# Patient Record
Sex: MALE | Race: WHITE | NOT HISPANIC OR LATINO
[De-identification: names, ages, dates, MRNs, and addresses within clinical notes are randomized per-mention and may not be internally consistent; named-entity substitution may affect disease eponyms.]

---

## 2017-07-13 PROBLEM — I49.3 PVC'S (PREMATURE VENTRICULAR CONTRACTIONS): Status: ACTIVE | Noted: 2017-07-13

## 2017-07-13 PROBLEM — I48.0 PAROXYSMAL ATRIAL FIBRILLATION (HCC): Status: ACTIVE | Noted: 2017-07-13

## 2017-07-13 PROBLEM — I74.9 TIA DUE TO EMBOLISM (HCC): Status: ACTIVE | Noted: 2017-07-13

## 2017-07-13 PROBLEM — G45.9 TIA DUE TO EMBOLISM (HCC): Status: ACTIVE | Noted: 2017-07-13

## 2017-07-13 PROBLEM — J41.0 SIMPLE CHRONIC BRONCHITIS (HCC): Status: ACTIVE | Noted: 2017-07-13

## 2017-07-27 ENCOUNTER — LAB SERVICES (OUTPATIENT)
Dept: OTHER | Age: 62
End: 2017-07-27

## 2017-07-27 ENCOUNTER — CHARTING TRANS (OUTPATIENT)
Dept: OTHER | Age: 62
End: 2017-07-27

## 2017-07-27 LAB — RAPID STREP GROUP A: POSITIVE

## 2017-07-27 ASSESSMENT — PAIN SCALES - GENERAL: PAINLEVEL_OUTOF10: 8

## 2017-08-16 ENCOUNTER — CHARTING TRANS (OUTPATIENT)
Dept: OTHER | Age: 62
End: 2017-08-16

## 2017-09-21 ENCOUNTER — CHARTING TRANS (OUTPATIENT)
Dept: OTHER | Age: 62
End: 2017-09-21

## 2017-10-10 ENCOUNTER — IMAGING SERVICES (OUTPATIENT)
Dept: OTHER | Age: 62
End: 2017-10-10

## 2017-10-10 ENCOUNTER — LAB SERVICES (OUTPATIENT)
Dept: OTHER | Age: 62
End: 2017-10-10

## 2017-10-10 ENCOUNTER — HOSPITAL (OUTPATIENT)
Dept: OTHER | Age: 62
End: 2017-10-10
Attending: INTERNAL MEDICINE

## 2017-10-19 ENCOUNTER — CHARTING TRANS (OUTPATIENT)
Dept: OTHER | Age: 62
End: 2017-10-19

## 2017-10-19 LAB
ALBUMIN SERPL-MCNC: 3.8 G/DL (ref 3.6–5.1)
ALBUMIN/GLOB SERPL: 1.1 (ref 1–2.4)
ALP SERPL-CCNC: 70 UNITS/L (ref 45–117)
ALT SERPL-CCNC: 35 UNITS/L
ANION GAP SERPL CALC-SCNC: 12 MMOL/L (ref 10–20)
APPEARANCE UR: CLEAR
AST SERPL-CCNC: 25 UNITS/L
BACTERIA #/AREA URNS HPF: NORMAL /HPF
BASOPHILS # BLD: 0.1 K/MCL (ref 0–0.3)
BASOPHILS NFR BLD: 1 %
BILIRUB SERPL-MCNC: 0.6 MG/DL (ref 0.2–1)
BILIRUB UR QL: NEGATIVE
BUN SERPL-MCNC: 19 MG/DL (ref 6–20)
BUN/CREAT SERPL: 23 (ref 7–25)
CALCIUM SERPL-MCNC: 9.3 MG/DL (ref 8.4–10.2)
CHLORIDE SERPL-SCNC: 108 MMOL/L (ref 98–107)
CHOLEST SERPL-MCNC: 174 MG/DL
CHOLEST/HDLC SERPL: 5.4
CO2 SERPL-SCNC: 23 MMOL/L (ref 21–32)
COLOR UR: YELLOW
CREAT SERPL-MCNC: 0.82 MG/DL (ref 0.67–1.17)
DIFFERENTIAL METHOD BLD: ABNORMAL
EOSINOPHIL # BLD: 0.3 K/MCL (ref 0.1–0.5)
EOSINOPHIL NFR BLD: 4 %
ERYTHROCYTE [DISTWIDTH] IN BLOOD: 14.7 % (ref 11–15)
GLOBULIN SER-MCNC: 3.4 G/DL (ref 2–4)
GLUCOSE SERPL-MCNC: 86 MG/DL (ref 65–99)
GLUCOSE UR-MCNC: NEGATIVE MG/DL
HDLC SERPL-MCNC: 32 MG/DL
HEMATOCRIT: 42.4 % (ref 39–51)
HEMOGLOBIN: 14 G/DL (ref 13–17)
HYALINE CASTS #/AREA URNS LPF: NORMAL /LPF (ref 0–5)
KETONES UR-MCNC: NEGATIVE MG/DL
LDLC SERPL CALC-MCNC: 110 MG/DL
LENGTH OF FAST TIME PATIENT: 12 HRS
LENGTH OF FAST TIME PATIENT: 12 HRS
LYMPHOCYTES # BLD: 2 K/MCL (ref 1–4)
LYMPHOCYTES NFR BLD: 27 %
MEAN CORPUSCULAR HEMOGLOBIN: 28.4 PG (ref 26–34)
MEAN CORPUSCULAR HGB CONC: 33 G/DL (ref 32–36.5)
MEAN CORPUSCULAR VOLUME: 86 FL (ref 78–100)
MONOCYTES # BLD: 0.5 K/MCL (ref 0.3–0.9)
MONOCYTES NFR BLD: 7 %
MUCOUS THREADS URNS QL MICRO: PRESENT
NEUTROPHILS # BLD: 4.7 K/MCL (ref 1.8–7.7)
NEUTROPHILS NFR BLD: 61 %
NITRITE UR QL: NEGATIVE
NONHDLC SERPL-MCNC: 142 MG/DL
PH UR: 6 UNITS (ref 5–7)
PLATELET COUNT: 187 K/MCL (ref 140–450)
POTASSIUM SERPL-SCNC: 4.2 MMOL/L (ref 3.4–5.1)
PROT UR QL: NEGATIVE MG/DL
RBC #/AREA URNS HPF: NORMAL /HPF (ref 0–3)
RBC-URINE: NEGATIVE
RED CELL COUNT: 4.93 MIL/MCL (ref 4.5–5.9)
SODIUM SERPL-SCNC: 139 MMOL/L (ref 135–145)
SP GR UR: 1.01 (ref 1–1.03)
SPECIMEN SOURCE: NORMAL
SPERM URNS QL MICRO: PRESENT
SQUAMOUS #/AREA URNS HPF: NORMAL /HPF (ref 0–5)
TOTAL PROTEIN: 7.2 G/DL (ref 6.4–8.2)
TRIGL SERPL-MCNC: 159 MG/DL
TSH SERPL-ACNC: 2.32 MCUNITS/ML (ref 0.35–5)
UROBILINOGEN UR QL: 0.2 MG/DL (ref 0–1)
WBC #/AREA URNS HPF: NORMAL /HPF (ref 0–5)
WBC-URINE: NEGATIVE
WHITE BLOOD COUNT: 7.5 K/MCL (ref 4.2–11)

## 2017-12-19 ENCOUNTER — CHARTING TRANS (OUTPATIENT)
Dept: OTHER | Age: 62
End: 2017-12-19

## 2018-01-15 PROBLEM — E66.9 OBESITY (BMI 35.0-39.9 WITHOUT COMORBIDITY): Status: ACTIVE | Noted: 2018-01-15

## 2018-01-15 PROBLEM — G89.29 CHRONIC BILATERAL LOW BACK PAIN WITHOUT SCIATICA: Status: ACTIVE | Noted: 2018-01-15

## 2018-01-15 PROBLEM — M54.50 CHRONIC BILATERAL LOW BACK PAIN WITHOUT SCIATICA: Status: ACTIVE | Noted: 2018-01-15

## 2018-01-15 PROBLEM — M62.830 BACK MUSCLE SPASM: Status: ACTIVE | Noted: 2018-01-15

## 2018-01-15 PROBLEM — I10 ESSENTIAL HYPERTENSION: Status: ACTIVE | Noted: 2018-01-15

## 2018-01-15 PROBLEM — J44.9 CHRONIC OBSTRUCTIVE PULMONARY DISEASE, UNSPECIFIED COPD TYPE (HCC): Status: ACTIVE | Noted: 2018-01-15

## 2018-05-02 PROBLEM — M79.642 BILATERAL HAND PAIN: Status: ACTIVE | Noted: 2018-05-02

## 2018-05-02 PROBLEM — M19.042 PRIMARY OSTEOARTHRITIS OF BOTH HANDS: Status: ACTIVE | Noted: 2018-05-02

## 2018-05-02 PROBLEM — E66.9 OBESITY (BMI 35.0-39.9 WITHOUT COMORBIDITY): Status: RESOLVED | Noted: 2018-01-15 | Resolved: 2018-05-02

## 2018-05-02 PROBLEM — E66.9 CLASS 1 OBESITY WITHOUT SERIOUS COMORBIDITY WITH BODY MASS INDEX (BMI) OF 32.0 TO 32.9 IN ADULT, UNSPECIFIED OBESITY TYPE: Status: ACTIVE | Noted: 2018-05-02

## 2018-05-02 PROBLEM — M79.641 BILATERAL HAND PAIN: Status: ACTIVE | Noted: 2018-05-02

## 2018-05-02 PROBLEM — M19.041 PRIMARY OSTEOARTHRITIS OF BOTH HANDS: Status: ACTIVE | Noted: 2018-05-02

## 2018-08-06 PROCEDURE — 80307 DRUG TEST PRSMV CHEM ANLYZR: CPT | Performed by: INTERNAL MEDICINE

## 2018-08-15 PROBLEM — M18.12 OSTEOARTHRITIS OF LEFT THUMB: Status: ACTIVE | Noted: 2018-08-15

## 2018-08-15 PROBLEM — E66.9 OBESITY (BMI 30.0-34.9): Status: ACTIVE | Noted: 2018-08-15

## 2018-08-15 PROBLEM — Z13.220 SCREENING CHOLESTEROL LEVEL: Status: ACTIVE | Noted: 2018-08-15

## 2018-08-15 PROBLEM — K21.9 GASTROESOPHAGEAL REFLUX DISEASE WITHOUT ESOPHAGITIS: Status: ACTIVE | Noted: 2018-08-15

## 2018-08-15 PROBLEM — M18.11 OSTEOARTHRITIS OF RIGHT THUMB: Status: ACTIVE | Noted: 2018-08-15

## 2018-08-15 PROBLEM — E66.9 CLASS 1 OBESITY WITHOUT SERIOUS COMORBIDITY WITH BODY MASS INDEX (BMI) OF 32.0 TO 32.9 IN ADULT, UNSPECIFIED OBESITY TYPE: Status: RESOLVED | Noted: 2018-05-02 | Resolved: 2018-08-15

## 2018-08-15 PROCEDURE — 81003 URINALYSIS AUTO W/O SCOPE: CPT | Performed by: INTERNAL MEDICINE

## 2018-11-02 VITALS
SYSTOLIC BLOOD PRESSURE: 122 MMHG | BODY MASS INDEX: 35 KG/M2 | HEIGHT: 72 IN | DIASTOLIC BLOOD PRESSURE: 73 MMHG | HEART RATE: 73 BPM | RESPIRATION RATE: 16 BRPM | TEMPERATURE: 98.2 F | WEIGHT: 258.38 LBS

## 2018-11-02 VITALS
SYSTOLIC BLOOD PRESSURE: 151 MMHG | TEMPERATURE: 97.9 F | RESPIRATION RATE: 18 BRPM | WEIGHT: 262.57 LBS | DIASTOLIC BLOOD PRESSURE: 88 MMHG | BODY MASS INDEX: 35.56 KG/M2 | HEIGHT: 72 IN | OXYGEN SATURATION: 95 % | HEART RATE: 75 BPM

## 2018-11-03 VITALS
BODY MASS INDEX: 34.99 KG/M2 | DIASTOLIC BLOOD PRESSURE: 74 MMHG | WEIGHT: 258.31 LBS | RESPIRATION RATE: 17 BRPM | SYSTOLIC BLOOD PRESSURE: 134 MMHG | TEMPERATURE: 98.1 F | HEIGHT: 72 IN | HEART RATE: 82 BPM

## 2018-11-03 VITALS
RESPIRATION RATE: 20 BRPM | TEMPERATURE: 98.2 F | HEART RATE: 74 BPM | WEIGHT: 255 LBS | OXYGEN SATURATION: 96 % | HEIGHT: 72 IN | BODY MASS INDEX: 34.54 KG/M2

## 2018-11-03 VITALS
BODY MASS INDEX: 34.73 KG/M2 | RESPIRATION RATE: 18 BRPM | HEART RATE: 79 BPM | WEIGHT: 256.44 LBS | SYSTOLIC BLOOD PRESSURE: 129 MMHG | HEIGHT: 72 IN | TEMPERATURE: 97.9 F | DIASTOLIC BLOOD PRESSURE: 69 MMHG

## 2018-11-17 PROBLEM — J41.0 SIMPLE CHRONIC BRONCHITIS (HCC): Status: RESOLVED | Noted: 2017-07-13 | Resolved: 2018-11-17

## 2018-11-24 PROBLEM — F34.1 DYSTHYMIA: Status: ACTIVE | Noted: 2018-11-24

## 2018-11-24 PROBLEM — Z23 NEED FOR VACCINATION FOR PNEUMOCOCCUS: Status: ACTIVE | Noted: 2018-11-24

## 2018-11-24 PROBLEM — Z23 NEED FOR PROPHYLACTIC VACCINATION AGAINST STREPTOCOCCUS PNEUMONIAE (PNEUMOCOCCUS): Status: ACTIVE | Noted: 2018-11-24

## 2018-11-24 PROBLEM — Z23 NEED FOR PROPHYLACTIC VACCINATION AND INOCULATION AGAINST INFLUENZA: Status: ACTIVE | Noted: 2018-11-24

## 2018-11-24 PROBLEM — R45.86 EMOTIONAL LABILITY: Status: ACTIVE | Noted: 2018-11-24

## 2018-11-24 PROBLEM — G25.81 RESTLESS LEG SYNDROME: Status: ACTIVE | Noted: 2018-11-24

## 2019-03-16 PROBLEM — Z12.5 SCREENING PSA (PROSTATE SPECIFIC ANTIGEN): Status: ACTIVE | Noted: 2019-03-16

## 2019-03-16 PROBLEM — R12 HEARTBURN: Status: ACTIVE | Noted: 2019-03-16

## 2019-07-10 PROBLEM — I48.92 ATRIAL FLUTTER BY ELECTROCARDIOGRAM (HCC): Status: ACTIVE | Noted: 2019-07-10

## 2019-07-10 PROBLEM — H91.93 DECREASED HEARING OF BOTH EARS: Status: ACTIVE | Noted: 2019-07-10

## 2019-07-18 PROBLEM — M75.81 ROTATOR CUFF TENDINITIS, RIGHT: Status: ACTIVE | Noted: 2019-07-18

## 2019-07-18 PROBLEM — M75.81 ROTATOR CUFF TENDONITIS, RIGHT: Status: ACTIVE | Noted: 2019-07-18

## 2019-07-22 PROBLEM — M25.511 CHRONIC PAIN OF BOTH SHOULDERS: Status: ACTIVE | Noted: 2019-07-22

## 2019-07-22 PROBLEM — M25.512 CHRONIC PAIN OF BOTH SHOULDERS: Status: ACTIVE | Noted: 2019-07-22

## 2019-07-22 PROBLEM — G89.29 CHRONIC PAIN OF BOTH SHOULDERS: Status: ACTIVE | Noted: 2019-07-22

## 2019-07-26 PROBLEM — I48.92 ATRIAL FLUTTER, UNSPECIFIED TYPE (HCC): Status: ACTIVE | Noted: 2019-07-26

## 2019-09-03 PROBLEM — Z23 NEED FOR PROPHYLACTIC VACCINATION AND INOCULATION AGAINST INFLUENZA: Status: RESOLVED | Noted: 2018-11-24 | Resolved: 2019-09-03

## 2019-09-03 PROBLEM — Z13.220 SCREENING CHOLESTEROL LEVEL: Status: RESOLVED | Noted: 2018-08-15 | Resolved: 2019-09-03

## 2019-09-03 PROBLEM — Z23 NEED FOR VACCINATION FOR PNEUMOCOCCUS: Status: RESOLVED | Noted: 2018-11-24 | Resolved: 2019-09-03

## 2019-09-03 PROBLEM — Z12.5 SCREENING PSA (PROSTATE SPECIFIC ANTIGEN): Status: RESOLVED | Noted: 2019-03-16 | Resolved: 2019-09-03

## 2019-09-21 PROBLEM — Z00.00 MEDICARE ANNUAL WELLNESS VISIT, SUBSEQUENT: Status: ACTIVE | Noted: 2019-09-21

## 2019-09-21 PROBLEM — G45.9 TIA DUE TO EMBOLISM (HCC): Status: RESOLVED | Noted: 2017-07-13 | Resolved: 2019-09-21

## 2019-09-21 PROBLEM — I74.9 TIA DUE TO EMBOLISM (HCC): Status: RESOLVED | Noted: 2017-07-13 | Resolved: 2019-09-21

## 2019-09-21 PROBLEM — I48.92 ATRIAL FLUTTER BY ELECTROCARDIOGRAM (HCC): Status: RESOLVED | Noted: 2019-07-10 | Resolved: 2019-09-21

## 2019-09-21 PROBLEM — Z00.00 ANNUAL PHYSICAL EXAM: Status: ACTIVE | Noted: 2019-09-21

## 2019-09-24 PROBLEM — I48.92 ATRIAL FLUTTER, UNSPECIFIED TYPE (HCC): Status: RESOLVED | Noted: 2019-07-26 | Resolved: 2019-09-24

## 2019-09-24 PROBLEM — Z23 NEED FOR PROPHYLACTIC VACCINATION AGAINST STREPTOCOCCUS PNEUMONIAE (PNEUMOCOCCUS): Status: RESOLVED | Noted: 2018-11-24 | Resolved: 2019-09-24

## 2019-09-24 PROBLEM — I49.3 PVC'S (PREMATURE VENTRICULAR CONTRACTIONS): Status: RESOLVED | Noted: 2017-07-13 | Resolved: 2019-09-24

## 2019-10-30 PROBLEM — I25.10 MILD CAD: Status: ACTIVE | Noted: 2019-10-30

## 2019-11-25 PROBLEM — Z00.00 ANNUAL PHYSICAL EXAM: Status: RESOLVED | Noted: 2019-09-21 | Resolved: 2019-11-25

## 2019-11-25 PROBLEM — G89.29 CHRONIC BILATERAL LOW BACK PAIN WITHOUT SCIATICA: Status: RESOLVED | Noted: 2018-01-15 | Resolved: 2019-11-25

## 2019-11-25 PROBLEM — M19.041 PRIMARY OSTEOARTHRITIS OF BOTH HANDS: Status: RESOLVED | Noted: 2018-05-02 | Resolved: 2019-11-25

## 2019-11-25 PROBLEM — I48.0 PAROXYSMAL ATRIAL FIBRILLATION (HCC): Status: RESOLVED | Noted: 2017-07-13 | Resolved: 2019-11-25

## 2019-11-25 PROBLEM — G45.9 TRANSIENT ISCHEMIC ATTACK: Status: ACTIVE | Noted: 2019-11-25

## 2019-11-25 PROBLEM — M25.512 CHRONIC PAIN OF BOTH SHOULDERS: Status: RESOLVED | Noted: 2019-07-22 | Resolved: 2019-11-25

## 2019-11-25 PROBLEM — I10 ESSENTIAL HYPERTENSION: Status: RESOLVED | Noted: 2018-01-15 | Resolved: 2019-11-25

## 2019-11-25 PROBLEM — R06.02 SHORTNESS OF BREATH: Status: ACTIVE | Noted: 2019-11-25

## 2019-11-25 PROBLEM — G89.29 CHRONIC PAIN OF BOTH SHOULDERS: Status: RESOLVED | Noted: 2019-07-22 | Resolved: 2019-11-25

## 2019-11-25 PROBLEM — Z00.00 MEDICARE ANNUAL WELLNESS VISIT, SUBSEQUENT: Status: RESOLVED | Noted: 2019-09-21 | Resolved: 2019-11-25

## 2019-11-25 PROBLEM — E66.9 OBESITY (BMI 30.0-34.9): Status: RESOLVED | Noted: 2018-08-15 | Resolved: 2019-11-25

## 2019-11-25 PROBLEM — J44.9 CHRONIC OBSTRUCTIVE PULMONARY DISEASE, UNSPECIFIED COPD TYPE (HCC): Status: RESOLVED | Noted: 2018-01-15 | Resolved: 2019-11-25

## 2019-11-25 PROBLEM — M19.042 PRIMARY OSTEOARTHRITIS OF BOTH HANDS: Status: RESOLVED | Noted: 2018-05-02 | Resolved: 2019-11-25

## 2019-11-25 PROBLEM — M75.81 ROTATOR CUFF TENDONITIS, RIGHT: Status: RESOLVED | Noted: 2019-07-18 | Resolved: 2019-11-25

## 2019-11-25 PROBLEM — R45.86 EMOTIONAL LABILITY: Status: ACTIVE | Noted: 2019-11-25

## 2019-11-25 PROBLEM — M19.90 OSTEOARTHRITIS: Status: ACTIVE | Noted: 2019-11-25

## 2019-11-25 PROBLEM — F34.1 DYSTHYMIA: Status: RESOLVED | Noted: 2018-11-24 | Resolved: 2019-11-25

## 2019-11-25 PROBLEM — Z23 NEED FOR VACCINATION WITH 13-POLYVALENT PNEUMOCOCCAL CONJUGATE VACCINE: Status: ACTIVE | Noted: 2019-11-25

## 2019-11-25 PROBLEM — G25.81 RESTLESS LEG SYNDROME: Status: RESOLVED | Noted: 2018-11-24 | Resolved: 2019-11-25

## 2019-11-25 PROBLEM — M54.50 CHRONIC BILATERAL LOW BACK PAIN WITHOUT SCIATICA: Status: RESOLVED | Noted: 2018-01-15 | Resolved: 2019-11-25

## 2019-11-25 PROBLEM — I49.3 VENTRICULAR PREMATURE BEATS: Status: ACTIVE | Noted: 2019-11-25

## 2019-11-25 PROBLEM — M18.12 OSTEOARTHRITIS OF LEFT THUMB: Status: RESOLVED | Noted: 2018-08-15 | Resolved: 2019-11-25

## 2019-11-25 PROBLEM — M25.511 CHRONIC PAIN OF BOTH SHOULDERS: Status: RESOLVED | Noted: 2019-07-22 | Resolved: 2019-11-25

## 2019-11-25 PROBLEM — R12 HEARTBURN: Status: RESOLVED | Noted: 2019-03-16 | Resolved: 2019-11-25

## 2019-11-25 PROBLEM — R45.86 EMOTIONAL LABILITY: Status: RESOLVED | Noted: 2018-11-24 | Resolved: 2019-11-25

## 2019-11-25 PROBLEM — M18.11 OSTEOARTHRITIS OF RIGHT THUMB: Status: RESOLVED | Noted: 2018-08-15 | Resolved: 2019-11-25

## 2019-11-25 PROBLEM — H91.93 DECREASED HEARING OF BOTH EARS: Status: RESOLVED | Noted: 2019-07-10 | Resolved: 2019-11-25

## 2019-11-25 PROBLEM — M62.830 BACK MUSCLE SPASM: Status: RESOLVED | Noted: 2018-01-15 | Resolved: 2019-11-25

## 2019-11-25 PROBLEM — M79.642 BILATERAL HAND PAIN: Status: RESOLVED | Noted: 2018-05-02 | Resolved: 2019-11-25

## 2019-11-25 PROBLEM — I25.10 MILD CAD: Status: RESOLVED | Noted: 2019-10-30 | Resolved: 2019-11-25

## 2019-11-25 PROBLEM — M79.641 BILATERAL HAND PAIN: Status: RESOLVED | Noted: 2018-05-02 | Resolved: 2019-11-25

## 2019-11-25 PROBLEM — I48.91 ATRIAL FIBRILLATION (HCC): Status: ACTIVE | Noted: 2019-11-25

## 2019-11-25 PROBLEM — M75.81 ROTATOR CUFF TENDINITIS, RIGHT: Status: RESOLVED | Noted: 2019-07-18 | Resolved: 2019-11-25

## 2019-11-25 PROBLEM — K21.9 GASTROESOPHAGEAL REFLUX DISEASE WITHOUT ESOPHAGITIS: Status: RESOLVED | Noted: 2018-08-15 | Resolved: 2019-11-25

## 2020-01-10 PROBLEM — Z98.890 STATUS POST ABLATION OF ATRIAL FIBRILLATION: Status: ACTIVE | Noted: 2020-01-10

## 2020-01-10 PROBLEM — Z86.79 STATUS POST ABLATION OF ATRIAL FIBRILLATION: Status: ACTIVE | Noted: 2020-01-10

## 2020-02-24 PROBLEM — M62.830 BACK MUSCLE SPASM: Status: ACTIVE | Noted: 2020-02-24

## 2020-02-24 PROBLEM — M75.81 ROTATOR CUFF TENDINITIS, RIGHT: Status: ACTIVE | Noted: 2020-02-24

## 2020-02-24 PROBLEM — M75.82 ROTATOR CUFF TENDINITIS, LEFT: Status: ACTIVE | Noted: 2020-02-24

## 2020-02-24 PROBLEM — F41.9 ANXIETY: Status: ACTIVE | Noted: 2020-02-24

## 2020-02-24 PROBLEM — G89.29 CHRONIC BILATERAL LOW BACK PAIN WITHOUT SCIATICA: Status: ACTIVE | Noted: 2020-02-24

## 2020-02-24 PROBLEM — M54.50 CHRONIC BILATERAL LOW BACK PAIN WITHOUT SCIATICA: Status: ACTIVE | Noted: 2020-02-24

## 2020-05-27 PROBLEM — R39.11 URINARY HESITANCY: Status: ACTIVE | Noted: 2020-05-27

## 2020-05-27 PROBLEM — M54.40 CHRONIC BILATERAL LOW BACK PAIN WITH SCIATICA, SCIATICA LATERALITY UNSPECIFIED: Status: ACTIVE | Noted: 2020-05-27

## 2020-05-27 PROBLEM — G89.29 CHRONIC BILATERAL LOW BACK PAIN WITH SCIATICA, SCIATICA LATERALITY UNSPECIFIED: Status: ACTIVE | Noted: 2020-05-27

## 2020-05-27 PROBLEM — R06.02 SHORTNESS OF BREATH: Status: RESOLVED | Noted: 2019-11-25 | Resolved: 2020-05-27

## 2020-05-27 PROBLEM — J44.9 COPD (CHRONIC OBSTRUCTIVE PULMONARY DISEASE) (HCC): Status: ACTIVE | Noted: 2018-01-15

## 2020-05-27 PROBLEM — N40.1 BENIGN PROSTATIC HYPERPLASIA WITH URINARY HESITANCY: Status: ACTIVE | Noted: 2020-05-27

## 2020-05-27 PROBLEM — R39.11 BENIGN PROSTATIC HYPERPLASIA WITH URINARY HESITANCY: Status: ACTIVE | Noted: 2020-05-27

## 2020-05-27 PROBLEM — M54.14 THORACIC RADICULOPATHY: Status: ACTIVE | Noted: 2020-05-27

## 2020-12-19 PROBLEM — M25.511 CHRONIC PAIN OF BOTH SHOULDERS: Status: ACTIVE | Noted: 2020-12-19

## 2020-12-19 PROBLEM — G89.29 CHRONIC PAIN OF BOTH SHOULDERS: Status: ACTIVE | Noted: 2020-12-19

## 2020-12-19 PROBLEM — M75.80 ROTATOR CUFF TENDINITIS, UNSPECIFIED LATERALITY: Status: ACTIVE | Noted: 2020-12-19

## 2020-12-19 PROBLEM — M25.512 CHRONIC PAIN OF BOTH SHOULDERS: Status: ACTIVE | Noted: 2020-12-19

## 2021-01-18 PROBLEM — G45.9 TIA DUE TO EMBOLISM (HCC): Status: ACTIVE | Noted: 2021-01-18

## 2021-01-18 PROBLEM — I74.9 TIA DUE TO EMBOLISM (HCC): Status: ACTIVE | Noted: 2021-01-18

## 2021-01-26 PROBLEM — H53.8 BLURRY VISION, BILATERAL: Status: ACTIVE | Noted: 2021-01-26

## 2021-01-26 PROBLEM — E78.1 HYPERTRIGLYCERIDEMIA: Status: ACTIVE | Noted: 2021-01-26

## 2021-01-26 PROBLEM — F32.1 CURRENT MODERATE EPISODE OF MAJOR DEPRESSIVE DISORDER WITHOUT PRIOR EPISODE (HCC): Status: ACTIVE | Noted: 2021-01-26

## 2021-01-26 PROBLEM — R05.9 COUGH: Status: ACTIVE | Noted: 2021-01-26

## 2021-01-26 PROBLEM — Z11.9 ENCOUNTER FOR SCREENING EXAMINATION FOR INFECTIOUS DISEASE: Status: ACTIVE | Noted: 2021-01-26

## 2021-01-26 PROBLEM — Z20.822 EXPOSURE TO COVID-19 VIRUS: Status: ACTIVE | Noted: 2021-01-26

## 2021-03-12 PROBLEM — T84.032S: Status: ACTIVE | Noted: 2021-03-12

## 2021-03-12 PROBLEM — G89.29 CHRONIC PAIN OF RIGHT KNEE: Status: ACTIVE | Noted: 2021-03-12

## 2021-03-12 PROBLEM — M25.561 CHRONIC PAIN OF RIGHT KNEE: Status: ACTIVE | Noted: 2021-03-12

## 2021-09-01 RX ORDER — ACETAMINOPHEN 500 MG
1000 TABLET ORAL ONCE
Status: CANCELLED | OUTPATIENT
Start: 2021-09-01 | End: 2021-09-01

## 2021-09-03 PROBLEM — R39.12 BENIGN PROSTATIC HYPERPLASIA WITH WEAK URINARY STREAM: Status: ACTIVE | Noted: 2021-09-03

## 2021-09-03 PROBLEM — N40.1 BENIGN PROSTATIC HYPERPLASIA WITH WEAK URINARY STREAM: Status: ACTIVE | Noted: 2021-09-03

## 2021-09-21 PROBLEM — Z01.818 PREOPERATIVE EXAMINATION: Status: ACTIVE | Noted: 2021-09-21

## 2021-09-21 PROBLEM — Z13.6 SCREENING FOR CARDIOVASCULAR CONDITION: Status: ACTIVE | Noted: 2021-09-21

## 2021-09-28 PROBLEM — R79.89 LOW VITAMIN D LEVEL: Status: ACTIVE | Noted: 2021-09-28

## 2021-09-28 PROBLEM — Z13.228 SCREENING FOR METABOLIC DISORDER: Status: ACTIVE | Noted: 2021-09-28

## 2021-10-15 PROBLEM — Z23 NEED FOR INFLUENZA VACCINATION: Status: ACTIVE | Noted: 2021-10-15

## 2021-10-22 NOTE — H&P (VIEW-ONLY)
Patient ID: Christopher Cedeno     Chief Complaint:    Patient presents with:  Pre-Op: pre op right total knee revision 10/28/21        HPI:    Christopher Cedeno is a 76year old who presents today for evaluation of their right TKA.    They were seen previ on file  Transportation Needs:       Lack of Transportation (Medical): Not on file      Lack of Transportation (Non-Medical):  Not on file  Physical Activity:       Days of Exercise per Week: Not on file      Minutes of Exercise per Session: Not on file  [facility] Hypertension Father        ROS:      All other pertinent positives and negatives as noted above in HPI. Physical Exam:     Vital Signs: There were no vitals taken for this visit.   Estimated body mass index is 31.55 kg/m² as calculated from the follo Arthroplasty in place.  Tibial Position - neutral, Femoral position - neutral. Polyethylene demonstrates symmetric wear. Bethena Fairview is no evidence of fracture or dislocation.  Patellar height - appopriate and alignment is appropriate.  Osteolysis noted most pr arthroplasty including but not limited to infection rates, neurologic and vascular injuries, continued pain, etc.  They fully understand and wish to proceed with surgery.       The spectrum of treatment options were discussed with the patient in detail incl given high risk patient.  (has taken ampicillin in past with no issues)     - risks, benefits and alternatives discussed  - labs reviewed and meds given   - proceed with right total knee arthroplasty as planned            There are no diagnoses linked to th

## 2021-10-25 ENCOUNTER — LABORATORY ENCOUNTER (OUTPATIENT)
Dept: LAB | Facility: HOSPITAL | Age: 66
End: 2021-10-25
Attending: ORTHOPAEDIC SURGERY
Payer: MEDICARE

## 2021-10-25 DIAGNOSIS — T84.032D MECHANICAL LOOSENING OF INTERNAL RIGHT KNEE PROSTHETIC JOINT, SUBSEQUENT ENCOUNTER: ICD-10-CM

## 2021-10-25 PROCEDURE — 86901 BLOOD TYPING SEROLOGIC RH(D): CPT

## 2021-10-25 PROCEDURE — 86900 BLOOD TYPING SEROLOGIC ABO: CPT

## 2021-10-25 PROCEDURE — 86850 RBC ANTIBODY SCREEN: CPT

## 2021-10-28 ENCOUNTER — ANESTHESIA (OUTPATIENT)
Dept: SURGERY | Facility: HOSPITAL | Age: 66
End: 2021-10-28
Payer: MEDICARE

## 2021-10-28 ENCOUNTER — HOSPITAL ENCOUNTER (OUTPATIENT)
Facility: HOSPITAL | Age: 66
Discharge: HOME | End: 2021-10-29
Attending: ORTHOPAEDIC SURGERY | Admitting: ORTHOPAEDIC SURGERY
Payer: MEDICARE

## 2021-10-28 ENCOUNTER — APPOINTMENT (OUTPATIENT)
Dept: GENERAL RADIOLOGY | Facility: HOSPITAL | Age: 66
End: 2021-10-28
Attending: ORTHOPAEDIC SURGERY
Payer: MEDICARE

## 2021-10-28 ENCOUNTER — ANESTHESIA EVENT (OUTPATIENT)
Dept: SURGERY | Facility: HOSPITAL | Age: 66
End: 2021-10-28
Payer: MEDICARE

## 2021-10-28 DIAGNOSIS — T84.032D MECHANICAL LOOSENING OF INTERNAL RIGHT KNEE PROSTHETIC JOINT, SUBSEQUENT ENCOUNTER: Primary | ICD-10-CM

## 2021-10-28 PROCEDURE — 87206 SMEAR FLUORESCENT/ACID STAI: CPT | Performed by: ORTHOPAEDIC SURGERY

## 2021-10-28 PROCEDURE — 87176 TISSUE HOMOGENIZATION CULTR: CPT | Performed by: ORTHOPAEDIC SURGERY

## 2021-10-28 PROCEDURE — 0SRC0J9 REPLACEMENT OF RIGHT KNEE JOINT WITH SYNTHETIC SUBSTITUTE, CEMENTED, OPEN APPROACH: ICD-10-PCS | Performed by: ORTHOPAEDIC SURGERY

## 2021-10-28 PROCEDURE — 88331 PATH CONSLTJ SURG 1 BLK 1SPC: CPT | Performed by: ORTHOPAEDIC SURGERY

## 2021-10-28 PROCEDURE — 87102 FUNGUS ISOLATION CULTURE: CPT | Performed by: ORTHOPAEDIC SURGERY

## 2021-10-28 PROCEDURE — 73560 X-RAY EXAM OF KNEE 1 OR 2: CPT | Performed by: ORTHOPAEDIC SURGERY

## 2021-10-28 PROCEDURE — 87070 CULTURE OTHR SPECIMN AEROBIC: CPT | Performed by: ORTHOPAEDIC SURGERY

## 2021-10-28 PROCEDURE — 0SPC0JZ REMOVAL OF SYNTHETIC SUBSTITUTE FROM RIGHT KNEE JOINT, OPEN APPROACH: ICD-10-PCS | Performed by: ORTHOPAEDIC SURGERY

## 2021-10-28 PROCEDURE — 76942 ECHO GUIDE FOR BIOPSY: CPT | Performed by: ANESTHESIOLOGY

## 2021-10-28 PROCEDURE — 88311 DECALCIFY TISSUE: CPT | Performed by: ORTHOPAEDIC SURGERY

## 2021-10-28 PROCEDURE — 87116 MYCOBACTERIA CULTURE: CPT | Performed by: ORTHOPAEDIC SURGERY

## 2021-10-28 PROCEDURE — 87075 CULTR BACTERIA EXCEPT BLOOD: CPT | Performed by: ORTHOPAEDIC SURGERY

## 2021-10-28 PROCEDURE — 88300 SURGICAL PATH GROSS: CPT | Performed by: ORTHOPAEDIC SURGERY

## 2021-10-28 PROCEDURE — 87205 SMEAR GRAM STAIN: CPT | Performed by: ORTHOPAEDIC SURGERY

## 2021-10-28 PROCEDURE — 88305 TISSUE EXAM BY PATHOLOGIST: CPT | Performed by: ORTHOPAEDIC SURGERY

## 2021-10-28 DEVICE — REFOBACIN BC R 1X40 US: Type: IMPLANTABLE DEVICE | Site: KNEE | Status: FUNCTIONAL

## 2021-10-28 DEVICE — IMPLANTABLE DEVICE: Type: IMPLANTABLE DEVICE | Site: KNEE | Status: FUNCTIONAL

## 2021-10-28 RX ORDER — BUPROPION HYDROCHLORIDE 100 MG/1
100 TABLET ORAL DAILY
Status: DISCONTINUED | OUTPATIENT
Start: 2021-10-29 | End: 2021-10-29

## 2021-10-28 RX ORDER — DEXAMETHASONE SODIUM PHOSPHATE 10 MG/ML
INJECTION, SOLUTION INTRAMUSCULAR; INTRAVENOUS AS NEEDED
Status: DISCONTINUED | OUTPATIENT
Start: 2021-10-28 | End: 2021-10-28 | Stop reason: SURG

## 2021-10-28 RX ORDER — DEXAMETHASONE SODIUM PHOSPHATE 4 MG/ML
VIAL (ML) INJECTION AS NEEDED
Status: DISCONTINUED | OUTPATIENT
Start: 2021-10-28 | End: 2021-10-28 | Stop reason: SURG

## 2021-10-28 RX ORDER — TAMSULOSIN HYDROCHLORIDE 0.4 MG/1
0.4 CAPSULE ORAL 2 TIMES DAILY
Status: DISCONTINUED | OUTPATIENT
Start: 2021-10-28 | End: 2021-10-29

## 2021-10-28 RX ORDER — POLYETHYLENE GLYCOL 3350 17 G/17G
17 POWDER, FOR SOLUTION ORAL DAILY PRN
Status: DISCONTINUED | OUTPATIENT
Start: 2021-10-28 | End: 2021-10-29

## 2021-10-28 RX ORDER — SODIUM CHLORIDE, SODIUM LACTATE, POTASSIUM CHLORIDE, CALCIUM CHLORIDE 600; 310; 30; 20 MG/100ML; MG/100ML; MG/100ML; MG/100ML
INJECTION, SOLUTION INTRAVENOUS CONTINUOUS
Status: DISCONTINUED | OUTPATIENT
Start: 2021-10-28 | End: 2021-10-29

## 2021-10-28 RX ORDER — DIPHENHYDRAMINE HYDROCHLORIDE 50 MG/ML
25 INJECTION INTRAMUSCULAR; INTRAVENOUS ONCE AS NEEDED
Status: ACTIVE | OUTPATIENT
Start: 2021-10-28 | End: 2021-10-28

## 2021-10-28 RX ORDER — BISACODYL 10 MG
10 SUPPOSITORY, RECTAL RECTAL
Status: DISCONTINUED | OUTPATIENT
Start: 2021-10-28 | End: 2021-10-29

## 2021-10-28 RX ORDER — BUPIVACAINE HYDROCHLORIDE 7.5 MG/ML
INJECTION, SOLUTION INTRASPINAL AS NEEDED
Status: DISCONTINUED | OUTPATIENT
Start: 2021-10-28 | End: 2021-10-28 | Stop reason: SURG

## 2021-10-28 RX ORDER — ROPINIROLE 1 MG/1
1 TABLET, FILM COATED ORAL 3 TIMES DAILY
COMMUNITY
End: 2022-01-07

## 2021-10-28 RX ORDER — GABAPENTIN 300 MG/1
300 CAPSULE ORAL 3 TIMES DAILY
Status: DISCONTINUED | OUTPATIENT
Start: 2021-10-28 | End: 2021-10-29

## 2021-10-28 RX ORDER — CLONIDINE 100 UG/ML
INJECTION, SOLUTION EPIDURAL AS NEEDED
Status: DISCONTINUED | OUTPATIENT
Start: 2021-10-28 | End: 2021-10-28 | Stop reason: SURG

## 2021-10-28 RX ORDER — METOCLOPRAMIDE HYDROCHLORIDE 5 MG/ML
10 INJECTION INTRAMUSCULAR; INTRAVENOUS AS NEEDED
Status: DISCONTINUED | OUTPATIENT
Start: 2021-10-28 | End: 2021-10-28 | Stop reason: HOSPADM

## 2021-10-28 RX ORDER — CEFAZOLIN SODIUM 1 G/3ML
INJECTION, POWDER, FOR SOLUTION INTRAMUSCULAR; INTRAVENOUS AS NEEDED
Status: DISCONTINUED | OUTPATIENT
Start: 2021-10-28 | End: 2021-10-28 | Stop reason: SURG

## 2021-10-28 RX ORDER — OXYCODONE HYDROCHLORIDE 5 MG/1
2.5 TABLET ORAL EVERY 4 HOURS PRN
Status: DISCONTINUED | OUTPATIENT
Start: 2021-10-28 | End: 2021-10-29

## 2021-10-28 RX ORDER — DIGOXIN 125 MCG
250 TABLET ORAL DAILY
Status: DISCONTINUED | OUTPATIENT
Start: 2021-10-29 | End: 2021-10-29

## 2021-10-28 RX ORDER — ACETAMINOPHEN 325 MG/1
650 TABLET ORAL ONCE
Status: COMPLETED | OUTPATIENT
Start: 2021-10-28 | End: 2021-10-28

## 2021-10-28 RX ORDER — SODIUM CHLORIDE, SODIUM LACTATE, POTASSIUM CHLORIDE, CALCIUM CHLORIDE 600; 310; 30; 20 MG/100ML; MG/100ML; MG/100ML; MG/100ML
INJECTION, SOLUTION INTRAVENOUS CONTINUOUS
Status: DISCONTINUED | OUTPATIENT
Start: 2021-10-28 | End: 2021-10-28 | Stop reason: HOSPADM

## 2021-10-28 RX ORDER — ACETAMINOPHEN 325 MG/1
TABLET ORAL
Status: COMPLETED
Start: 2021-10-28 | End: 2021-10-28

## 2021-10-28 RX ORDER — DEXAMETHASONE SODIUM PHOSPHATE 10 MG/ML
8 INJECTION, SOLUTION INTRAMUSCULAR; INTRAVENOUS ONCE
Status: COMPLETED | OUTPATIENT
Start: 2021-10-29 | End: 2021-10-29

## 2021-10-28 RX ORDER — ONDANSETRON 2 MG/ML
4 INJECTION INTRAMUSCULAR; INTRAVENOUS AS NEEDED
Status: DISCONTINUED | OUTPATIENT
Start: 2021-10-28 | End: 2021-10-28 | Stop reason: HOSPADM

## 2021-10-28 RX ORDER — ROPIVACAINE HYDROCHLORIDE 5 MG/ML
INJECTION, SOLUTION EPIDURAL; INFILTRATION; PERINEURAL AS NEEDED
Status: DISCONTINUED | OUTPATIENT
Start: 2021-10-28 | End: 2021-10-28 | Stop reason: SURG

## 2021-10-28 RX ORDER — KETAMINE HYDROCHLORIDE 50 MG/ML
INJECTION, SOLUTION, CONCENTRATE INTRAMUSCULAR; INTRAVENOUS AS NEEDED
Status: DISCONTINUED | OUTPATIENT
Start: 2021-10-28 | End: 2021-10-28 | Stop reason: SURG

## 2021-10-28 RX ORDER — BUPRENORPHINE HYDROCHLORIDE 0.32 MG/ML
INJECTION INTRAMUSCULAR; INTRAVENOUS AS NEEDED
Status: DISCONTINUED | OUTPATIENT
Start: 2021-10-28 | End: 2021-10-28 | Stop reason: SURG

## 2021-10-28 RX ORDER — MIDAZOLAM HYDROCHLORIDE 1 MG/ML
INJECTION INTRAMUSCULAR; INTRAVENOUS AS NEEDED
Status: DISCONTINUED | OUTPATIENT
Start: 2021-10-28 | End: 2021-10-28 | Stop reason: SURG

## 2021-10-28 RX ORDER — SENNOSIDES 8.6 MG
17.2 TABLET ORAL NIGHTLY
Status: DISCONTINUED | OUTPATIENT
Start: 2021-10-28 | End: 2021-10-29

## 2021-10-28 RX ORDER — HYDROMORPHONE HYDROCHLORIDE 1 MG/ML
0.2 INJECTION, SOLUTION INTRAMUSCULAR; INTRAVENOUS; SUBCUTANEOUS EVERY 2 HOUR PRN
Status: DISCONTINUED | OUTPATIENT
Start: 2021-10-28 | End: 2021-10-29

## 2021-10-28 RX ORDER — FLECAINIDE ACETATE 100 MG/1
150 TABLET ORAL 2 TIMES DAILY
Status: DISCONTINUED | OUTPATIENT
Start: 2021-10-28 | End: 2021-10-29

## 2021-10-28 RX ORDER — TRANEXAMIC ACID 10 MG/ML
1000 INJECTION, SOLUTION INTRAVENOUS ONCE
Status: COMPLETED | OUTPATIENT
Start: 2021-10-28 | End: 2021-10-28

## 2021-10-28 RX ORDER — OMEPRAZOLE 40 MG/1
40 CAPSULE, DELAYED RELEASE ORAL DAILY
COMMUNITY
End: 2021-11-15

## 2021-10-28 RX ORDER — HYDROMORPHONE HYDROCHLORIDE 1 MG/ML
0.4 INJECTION, SOLUTION INTRAMUSCULAR; INTRAVENOUS; SUBCUTANEOUS EVERY 2 HOUR PRN
Status: DISCONTINUED | OUTPATIENT
Start: 2021-10-28 | End: 2021-10-29

## 2021-10-28 RX ORDER — SODIUM PHOSPHATE, DIBASIC AND SODIUM PHOSPHATE, MONOBASIC 7; 19 G/133ML; G/133ML
1 ENEMA RECTAL ONCE AS NEEDED
Status: DISCONTINUED | OUTPATIENT
Start: 2021-10-28 | End: 2021-10-29

## 2021-10-28 RX ORDER — MELATONIN
325
Status: DISCONTINUED | OUTPATIENT
Start: 2021-10-29 | End: 2021-10-29

## 2021-10-28 RX ORDER — DOCUSATE SODIUM 100 MG/1
100 CAPSULE, LIQUID FILLED ORAL 2 TIMES DAILY
Status: DISCONTINUED | OUTPATIENT
Start: 2021-10-28 | End: 2021-10-29

## 2021-10-28 RX ORDER — ALPRAZOLAM 0.5 MG/1
0.5 TABLET ORAL 2 TIMES DAILY PRN
Status: DISCONTINUED | OUTPATIENT
Start: 2021-10-28 | End: 2021-10-29

## 2021-10-28 RX ORDER — OXYCODONE HYDROCHLORIDE 5 MG/1
5 TABLET ORAL EVERY 4 HOURS PRN
Status: DISCONTINUED | OUTPATIENT
Start: 2021-10-28 | End: 2021-10-29

## 2021-10-28 RX ORDER — ACETAMINOPHEN 325 MG/1
650 TABLET ORAL 4 TIMES DAILY
Status: DISCONTINUED | OUTPATIENT
Start: 2021-10-28 | End: 2021-10-29

## 2021-10-28 RX ORDER — SODIUM CHLORIDE 9 MG/ML
INJECTION, SOLUTION INTRAVENOUS CONTINUOUS
Status: DISCONTINUED | OUTPATIENT
Start: 2021-10-28 | End: 2021-10-29

## 2021-10-28 RX ORDER — KETOROLAC TROMETHAMINE 15 MG/ML
15 INJECTION, SOLUTION INTRAMUSCULAR; INTRAVENOUS EVERY 6 HOURS
Status: DISPENSED | OUTPATIENT
Start: 2021-10-28 | End: 2021-10-29

## 2021-10-28 RX ORDER — HYDROMORPHONE HYDROCHLORIDE 1 MG/ML
0.4 INJECTION, SOLUTION INTRAMUSCULAR; INTRAVENOUS; SUBCUTANEOUS EVERY 5 MIN PRN
Status: DISCONTINUED | OUTPATIENT
Start: 2021-10-28 | End: 2021-10-28 | Stop reason: HOSPADM

## 2021-10-28 RX ORDER — TRANEXAMIC ACID 10 MG/ML
1000 INJECTION, SOLUTION INTRAVENOUS ONCE
Status: DISCONTINUED | OUTPATIENT
Start: 2021-10-28 | End: 2021-10-28 | Stop reason: HOSPADM

## 2021-10-28 RX ORDER — ROPINIROLE 1 MG/1
1 TABLET, FILM COATED ORAL 3 TIMES DAILY
Status: DISCONTINUED | OUTPATIENT
Start: 2021-10-28 | End: 2021-10-29

## 2021-10-28 RX ORDER — DIPHENHYDRAMINE HYDROCHLORIDE 50 MG/ML
12.5 INJECTION INTRAMUSCULAR; INTRAVENOUS EVERY 4 HOURS PRN
Status: DISCONTINUED | OUTPATIENT
Start: 2021-10-28 | End: 2021-10-29

## 2021-10-28 RX ORDER — ONDANSETRON 2 MG/ML
INJECTION INTRAMUSCULAR; INTRAVENOUS AS NEEDED
Status: DISCONTINUED | OUTPATIENT
Start: 2021-10-28 | End: 2021-10-28 | Stop reason: SURG

## 2021-10-28 RX ORDER — SERTRALINE HYDROCHLORIDE 100 MG/1
200 TABLET, FILM COATED ORAL DAILY
Status: DISCONTINUED | OUTPATIENT
Start: 2021-10-29 | End: 2021-10-29

## 2021-10-28 RX ORDER — VANCOMYCIN HYDROCHLORIDE
15 ONCE
Status: DISCONTINUED | OUTPATIENT
Start: 2021-10-28 | End: 2021-10-28 | Stop reason: HOSPADM

## 2021-10-28 RX ORDER — PROCHLORPERAZINE EDISYLATE 5 MG/ML
10 INJECTION INTRAMUSCULAR; INTRAVENOUS EVERY 6 HOURS PRN
Status: DISCONTINUED | OUTPATIENT
Start: 2021-10-28 | End: 2021-10-29

## 2021-10-28 RX ORDER — ERGOCALCIFEROL (VITAMIN D2) 1250 MCG
50000 CAPSULE ORAL WEEKLY
COMMUNITY

## 2021-10-28 RX ORDER — DIPHENHYDRAMINE HCL 25 MG
25 CAPSULE ORAL EVERY 4 HOURS PRN
Status: DISCONTINUED | OUTPATIENT
Start: 2021-10-28 | End: 2021-10-29

## 2021-10-28 RX ORDER — NALOXONE HYDROCHLORIDE 0.4 MG/ML
80 INJECTION, SOLUTION INTRAMUSCULAR; INTRAVENOUS; SUBCUTANEOUS AS NEEDED
Status: DISCONTINUED | OUTPATIENT
Start: 2021-10-28 | End: 2021-10-28 | Stop reason: HOSPADM

## 2021-10-28 RX ORDER — TRAMADOL HYDROCHLORIDE 50 MG/1
50 TABLET ORAL EVERY 6 HOURS
Status: DISCONTINUED | OUTPATIENT
Start: 2021-10-28 | End: 2021-10-29

## 2021-10-28 RX ORDER — CEFAZOLIN SODIUM/WATER 2 G/20 ML
2 SYRINGE (ML) INTRAVENOUS EVERY 8 HOURS
Status: COMPLETED | OUTPATIENT
Start: 2021-10-28 | End: 2021-10-29

## 2021-10-28 RX ORDER — PANTOPRAZOLE SODIUM 40 MG/1
40 TABLET, DELAYED RELEASE ORAL
Status: DISCONTINUED | OUTPATIENT
Start: 2021-10-29 | End: 2021-10-29

## 2021-10-28 RX ORDER — HYDROMORPHONE HYDROCHLORIDE 1 MG/ML
INJECTION, SOLUTION INTRAMUSCULAR; INTRAVENOUS; SUBCUTANEOUS
Status: COMPLETED
Start: 2021-10-28 | End: 2021-10-28

## 2021-10-28 RX ORDER — ONDANSETRON 2 MG/ML
4 INJECTION INTRAMUSCULAR; INTRAVENOUS EVERY 4 HOURS PRN
Status: DISCONTINUED | OUTPATIENT
Start: 2021-10-28 | End: 2021-10-29

## 2021-10-28 RX ADMIN — SODIUM CHLORIDE, SODIUM LACTATE, POTASSIUM CHLORIDE, CALCIUM CHLORIDE: 600; 310; 30; 20 INJECTION, SOLUTION INTRAVENOUS at 14:12:00

## 2021-10-28 RX ADMIN — KETAMINE HYDROCHLORIDE 10 MG: 50 INJECTION, SOLUTION, CONCENTRATE INTRAMUSCULAR; INTRAVENOUS at 15:00:00

## 2021-10-28 RX ADMIN — DEXAMETHASONE SODIUM PHOSPHATE 2 MG: 10 INJECTION, SOLUTION INTRAMUSCULAR; INTRAVENOUS at 14:23:00

## 2021-10-28 RX ADMIN — ONDANSETRON 4 MG: 2 INJECTION INTRAMUSCULAR; INTRAVENOUS at 14:43:00

## 2021-10-28 RX ADMIN — ROPIVACAINE HYDROCHLORIDE 20 ML: 5 INJECTION, SOLUTION EPIDURAL; INFILTRATION; PERINEURAL at 14:23:00

## 2021-10-28 RX ADMIN — CEFAZOLIN SODIUM 2 G: 1 INJECTION, POWDER, FOR SOLUTION INTRAMUSCULAR; INTRAVENOUS at 14:26:00

## 2021-10-28 RX ADMIN — MIDAZOLAM HYDROCHLORIDE 2 MG: 1 INJECTION INTRAMUSCULAR; INTRAVENOUS at 14:15:00

## 2021-10-28 RX ADMIN — CLONIDINE 50 MCG: 100 INJECTION, SOLUTION EPIDURAL at 14:23:00

## 2021-10-28 RX ADMIN — BUPIVACAINE HYDROCHLORIDE 2 ML: 7.5 INJECTION, SOLUTION INTRASPINAL at 14:19:00

## 2021-10-28 RX ADMIN — BUPRENORPHINE HYDROCHLORIDE 150 MCG: 0.32 INJECTION INTRAMUSCULAR; INTRAVENOUS at 14:23:00

## 2021-10-28 RX ADMIN — DEXAMETHASONE SODIUM PHOSPHATE 8 MG: 4 MG/ML VIAL (ML) INJECTION at 14:43:00

## 2021-10-28 RX ADMIN — TRANEXAMIC ACID 1000 MG: 10 INJECTION, SOLUTION INTRAVENOUS at 14:26:00

## 2021-10-28 NOTE — ANESTHESIA PREPROCEDURE EVALUATION
PRE-OP EVALUATION    Patient Name: Mervat Bañuelos    Admit Diagnosis: Mechanical loosening of internal right knee prosthetic joint, subsequent encounter [A43.848N]    Pre-op Diagnosis: Mechanical loosening of internal right knee prosthetic joint, subseq Meals, Disp: , Rfl:   losartan Potassium 100 MG Oral Tab, Take 1 tablet (100 mg total) by mouth daily. , Disp: 90 tablet, Rfl: 3  Sertraline HCl 100 MG Oral Tab, Take 2 tablets (200 mg total) by mouth daily. , Disp: 60 tablet, Rfl: 5  fluticasone-salmeterol mouth 2 (two) times daily. , Disp: 180 tablet, Rfl: 1        Allergies: Chlorine and Penicillins      Anesthesia Evaluation    Patient summary reviewed.     Anesthetic Complications  (-) history of anesthetic complications         GI/Hepatic/Renal      (+) G regular  Rate: normal  (-) murmur   Dental      Dental appliance(s): upper dentures and lower dentures       Pulmonary    Pulmonary exam normal.  Breath sounds clear to auscultation bilaterally.                Other findings            ASA: 3   Plan: spinal

## 2021-10-28 NOTE — OPERATIVE REPORT
OPERATIVE REPORT    Facility: [facility]  Patient name: Maeola Mcardle  : [id number]        Medical record: [id number]  Date of procedure: 2021  Pre-operative diagnosis: Failed Right total knee arthroplasty  Post-operative diagnosis: of the components requiring revision surgery. We also discussed the possibility of an occult infection and that multiple specimens would be taken in the operating room.  Should these demonstrate anything concerning then he may require prolonged antibiotics of the synovium. A saw and osteotomes was then inserted between the femoral component and the cement interface. The component was removed with minimal bone loss.  The cement was then removed from the femur and a tissue culture was sent from the femoral surf observed. A trial polyethylene was then inserted. The trial components were then placed and the knee was found to have proper alignment and was stable through a full range of flexion and extension.    Scar tissue was then removed from around the location alignment. The patella tracked central with only slight lateral tilt which was corrected once the large arthrotomy was closed temporarily with towel clips. The trial was removed and the final poly surface was implanted.    A dilute (0.35%) betadine lavage w

## 2021-10-28 NOTE — ANESTHESIA PROCEDURE NOTES
Regional Block  Performed by: Jeremias Kellogg MD  Authorized by: Jeremias Kellogg MD       General Information and Staff    Start Time:  10/28/2021 2:20 PM  End Time:  10/28/2021 2:23 PM  Anesthesiologist:  Jeremias Kellogg MD  Performed by:   Anesth

## 2021-10-28 NOTE — INTERVAL H&P NOTE
Pre-op Diagnosis: Mechanical loosening of internal right knee prosthetic joint, subsequent encounter [T84.251D]    The above referenced H&P was reviewed by Alvaro Troncoso MD on 10/28/2021, the patient was examined and no significant changes have occurred i

## 2021-10-28 NOTE — ANESTHESIA PROCEDURE NOTES
Spinal Block  Performed by: Afshan Bundy MD  Authorized by: Afshan Bundy MD       General Information and Staff    Start Time:  10/28/2021 2:13 PM  End Time:  10/28/2021 2:19 PM  Anesthesiologist:  Afshan Bundy MD  Performed by:   Anesthes

## 2021-10-28 NOTE — ANESTHESIA POSTPROCEDURE EVALUATION
[address] Patient Status:  Outpatient in a Bed   Age/Gender 78year old male MRN [id number]   [facility] SURGERY Attending Meryl Phillips MD   Hosp Day # 0 PCP Fredi José MD       Anesthesia Post-op Note    RE

## 2021-10-29 VITALS
DIASTOLIC BLOOD PRESSURE: 89 MMHG | TEMPERATURE: 98 F | HEART RATE: 74 BPM | OXYGEN SATURATION: 94 % | WEIGHT: 226 LBS | HEIGHT: 72 IN | BODY MASS INDEX: 30.61 KG/M2 | RESPIRATION RATE: 20 BRPM | SYSTOLIC BLOOD PRESSURE: 116 MMHG

## 2021-10-29 PROCEDURE — 97165 OT EVAL LOW COMPLEX 30 MIN: CPT

## 2021-10-29 PROCEDURE — 97535 SELF CARE MNGMENT TRAINING: CPT

## 2021-10-29 PROCEDURE — 85018 HEMOGLOBIN: CPT | Performed by: ORTHOPAEDIC SURGERY

## 2021-10-29 PROCEDURE — 97161 PT EVAL LOW COMPLEX 20 MIN: CPT

## 2021-10-29 PROCEDURE — 97116 GAIT TRAINING THERAPY: CPT

## 2021-10-29 PROCEDURE — 94640 AIRWAY INHALATION TREATMENT: CPT

## 2021-10-29 PROCEDURE — 85014 HEMATOCRIT: CPT | Performed by: ORTHOPAEDIC SURGERY

## 2021-10-29 RX ORDER — CARVEDILOL 6.25 MG/1
6.25 TABLET ORAL 2 TIMES DAILY WITH MEALS
Status: DISCONTINUED | OUTPATIENT
Start: 2021-10-29 | End: 2021-10-29

## 2021-10-29 RX ORDER — LOSARTAN POTASSIUM 100 MG/1
100 TABLET ORAL DAILY
Status: DISCONTINUED | OUTPATIENT
Start: 2021-10-29 | End: 2021-10-29

## 2021-10-29 NOTE — PLAN OF CARE
Pt A&Ox4, VSS on 2L NC- IS encouraged. SCDs to BLE. Aquacel to R knee C/D/I w/ spandagrip. Teds on LLE. Accordion drain- sanguineous drainage. Pain controlled w/ PO meds. Voiding w/o difficulty; last BM 10/28/21. Tolerating regular diet.  WBAT- therapy to s

## 2021-10-29 NOTE — PROGRESS NOTES
Progress Note    Patient: Baron Najjar  Medical record #: [id number]    Baron Najjar is a 78year old y/o male who is POD #1 Revision right TKA. The patient's main complaint today is surgical pain at the operative site.   No numbness or tingli mg, Oral, QID  oxyCODONE immediate release tab 2.5 mg, 2.5 mg, Oral, Q4H PRN   Or  oxyCODONE immediate release tab 5 mg, 5 mg, Oral, Q4H PRN  HYDROmorphone HCl (DILAUDID) 1 MG/ML injection 0.2 mg, 0.2 mg, Intravenous, Q2H PRN   Or  HYDROmorphone HCl (DILAU syndrome      Labs:   Lab Results   Component Value Date    HGB 11.9 10/29/2021    HCT 36.9 10/29/2021         Assessment: 78year old  male POD #1 Revision right TKA    Plan:  Hgb - 11.9, acute blood loss anemia, consistent with post op changes, asymptoma

## 2021-10-29 NOTE — CM/SW NOTE
DUNG pre op Carter Rhodes. SW confirmed and sent updates in [facility]. SW met with pt and provided Aidin quality data on Blair. Pt does not have any needs at this time, SW to be made available if needs arise.      Landen 106, LSW

## 2021-10-29 NOTE — PROGRESS NOTES
Discharge instructions reviewed with patient. All questions answered. Patient states he watched discharge video. IV removed. Discharge home with Blossom Houser.

## 2021-10-29 NOTE — CONSULTS
[facility] Hospitalist Initial Consult       Reason for Consult: Medical Management sp TKA      History of Present Illness: Patient is a 78year old male with PMH sig for Afib, COPD, HTN who presents sp the above procedure.  He tolerated the procedure well without Infusions:   • lactated ringers Stopped (10/29/21 0802)   • sodium chloride Stopped (10/29/21 0400)     PRN: sodium chloride 0.9%, polyethylene glycol (PEG 3350), magnesium hydroxide, bisacodyl, Fleet Enema, ondansetron, Prochlorperazine Edisylate, diphenh Labs   Lab 10/29/21  0551   HGB 11.9*       No results for input(s): NA, K, CL, CO2, BUN, CREATSERUM, CA, CAION, MG, PHOS, GLU in the last 168 hours. No results for input(s): ALT, AST, ALB, AMYLASE, LIPASE, LDH in the last 168 hours.     Invalid input(s)

## 2021-10-29 NOTE — PHYSICAL THERAPY NOTE
PHYSICAL THERAPY QUICK EVALUATION - INPATIENT    Room Number: 370/370-A  Evaluation Date: 10/29/2021  Presenting Problem: s/p revision of R TKA 10/28/2021  Physician Order: PT Eval and Treat    History related to current admission: Patient is a 78 year o with ADLs, ambulates with no AD, and drives. Pt's spouse will be home and able to assist as needed.      SUBJECTIVE  \"I have to watch my toes turning out when I'm walking\"     OBJECTIVE  Precautions: Bed/chair alarm;Drain(s)  Fall Risk: High fall risk Assistance: Supervision  Distance (ft): 150  Assistive Device: Rolling walker  Pattern: R Decreased stance time (step to pattern, BLE external rotation)  Stoop/Curb Assistance: Not tested  Comment : Pt ascended and descended 4 stairs with use of B rails, s discharged from Physical Therapy services. Please re-order if a new functional limitation presents during this admission. GOALS  Patient was able to achieve the following goals . ..     Patient was able to transfer supervision which he will have at home

## 2021-10-29 NOTE — PLAN OF CARE
NURSING ADMISSION NOTE      Patient admitted via bed from PACU at [address] [place] [address] to room. Safety precautions initiated. Bed in low position. Call light in reach. Alert and oriented x4. VSS. O2 2L per NC.  Right knee pain described as cramping, mus

## 2021-10-29 NOTE — OCCUPATIONAL THERAPY NOTE
OCCUPATIONAL THERAPY QUICK EVALUATION - INPATIENT    Room Number: 370/370-A  Evaluation Date: 10/29/2021     Type of Evaluation: Quick Eval  Presenting Problem: s/p R TKA revision on 10/28     Physician Order: IP Consult to Occupational Therapy  Reason for SITUATION  Type of Home: Apartment  Home Layout: One level (2nd floor no elevator )  Lives With: Spouse    Toilet and Equipment: Standard height toilet; Toilet riser  Shower/Tub and Equipment: Tub-shower combo  Other Equipment: Other (Comment) (RW)    Occup ASSESSMENT  Supine to Sit : Not tested  Sit to Stand: Supervision    Skilled Therapy Provided: Pt was found sitting up in a chair. Pt was educated on safety.  Pt performed lower body dressing with supervision assist and upper body dressing with supervision Recommendations: None    PLAN   Patient has been evaluated and presents with no skilled Occupational Therapy needs at this time. Patient discharged from Occupational Therapy services.   Please re-order if a new functional limitation presents during this ad

## 2021-11-16 PROBLEM — Z96.651 STATUS POST REVISION OF TOTAL KNEE REPLACEMENT, RIGHT: Status: ACTIVE | Noted: 2021-11-16

## 2021-12-06 PROBLEM — M25.511 CHRONIC RIGHT SHOULDER PAIN: Status: ACTIVE | Noted: 2021-12-06

## 2021-12-06 PROBLEM — G89.29 CHRONIC RIGHT SHOULDER PAIN: Status: ACTIVE | Noted: 2021-12-06

## 2021-12-06 PROBLEM — F32.1 CURRENT MODERATE EPISODE OF MAJOR DEPRESSIVE DISORDER WITHOUT PRIOR EPISODE (HCC): Status: RESOLVED | Noted: 2021-01-26 | Resolved: 2021-12-06

## 2021-12-06 PROBLEM — M75.81 TENDINITIS OF RIGHT ROTATOR CUFF: Status: ACTIVE | Noted: 2021-12-06

## 2021-12-06 PROBLEM — F33.0 DEPRESSION, MAJOR, RECURRENT, MILD (HCC): Status: ACTIVE | Noted: 2021-12-06

## 2021-12-06 PROBLEM — Z20.822 EXPOSURE TO COVID-19 VIRUS: Status: RESOLVED | Noted: 2021-01-26 | Resolved: 2021-12-06

## 2022-02-25 PROBLEM — I48.0 PAF (PAROXYSMAL ATRIAL FIBRILLATION) (HCC): Status: ACTIVE | Noted: 2022-02-25

## 2022-02-25 PROBLEM — I70.0 THORACIC AORTA ATHEROSCLEROSIS (HCC): Status: ACTIVE | Noted: 2022-02-25

## 2022-02-25 PROBLEM — M75.80 ROTATOR CUFF TENDINITIS, UNSPECIFIED LATERALITY: Status: RESOLVED | Noted: 2020-12-19 | Resolved: 2022-02-25

## 2022-02-25 PROBLEM — D68.69 SECONDARY HYPERCOAGULABLE STATE (HCC): Status: ACTIVE | Noted: 2022-02-25

## 2022-02-25 PROBLEM — I48.91 ATRIAL FIBRILLATION (HCC): Status: RESOLVED | Noted: 2019-11-25 | Resolved: 2022-02-25

## 2022-03-04 PROBLEM — R39.11 BENIGN PROSTATIC HYPERPLASIA WITH URINARY HESITANCY: Status: RESOLVED | Noted: 2020-05-27 | Resolved: 2022-03-04

## 2022-03-04 PROBLEM — Z13.1 SCREENING FOR DIABETES MELLITUS: Status: ACTIVE | Noted: 2022-03-04

## 2022-03-04 PROBLEM — R39.12 BENIGN PROSTATIC HYPERPLASIA WITH WEAK URINARY STREAM: Status: RESOLVED | Noted: 2021-09-03 | Resolved: 2022-03-04

## 2022-03-04 PROBLEM — N40.0 BENIGN PROSTATIC HYPERPLASIA WITHOUT LOWER URINARY TRACT SYMPTOMS: Status: ACTIVE | Noted: 2022-03-04

## 2022-03-04 PROBLEM — N40.1 BENIGN PROSTATIC HYPERPLASIA WITH URINARY HESITANCY: Status: RESOLVED | Noted: 2020-05-27 | Resolved: 2022-03-04

## 2022-03-04 PROBLEM — I48.0 PAROXYSMAL ATRIAL FIBRILLATION (HCC): Status: ACTIVE | Noted: 2022-03-04

## 2022-03-04 PROBLEM — N40.1 BENIGN PROSTATIC HYPERPLASIA WITH WEAK URINARY STREAM: Status: RESOLVED | Noted: 2021-09-03 | Resolved: 2022-03-04

## 2024-01-04 ENCOUNTER — HOSPITAL ENCOUNTER (OUTPATIENT)
Facility: HOSPITAL | Age: 69
Setting detail: HOSPITAL OUTPATIENT SURGERY
Discharge: HOME | End: 2024-01-04
Attending: INTERNAL MEDICINE | Admitting: INTERNAL MEDICINE
Payer: MEDICARE

## 2024-01-04 ENCOUNTER — ANESTHESIA EVENT (OUTPATIENT)
Dept: ENDOSCOPY | Facility: HOSPITAL | Age: 69
End: 2024-01-04
Payer: MEDICARE

## 2024-01-04 ENCOUNTER — ANESTHESIA (OUTPATIENT)
Dept: ENDOSCOPY | Facility: HOSPITAL | Age: 69
End: 2024-01-04
Payer: MEDICARE

## 2024-01-04 VITALS
BODY MASS INDEX: 29.12 KG/M2 | RESPIRATION RATE: 16 BRPM | HEART RATE: 54 BPM | SYSTOLIC BLOOD PRESSURE: 127 MMHG | TEMPERATURE: 97 F | WEIGHT: 215 LBS | HEIGHT: 72 IN | DIASTOLIC BLOOD PRESSURE: 83 MMHG | OXYGEN SATURATION: 97 %

## 2024-01-04 PROCEDURE — 0DBK8ZX EXCISION OF ASCENDING COLON, VIA NATURAL OR ARTIFICIAL OPENING ENDOSCOPIC, DIAGNOSTIC: ICD-10-PCS | Performed by: INTERNAL MEDICINE

## 2024-01-04 PROCEDURE — 0DB18ZX EXCISION OF UPPER ESOPHAGUS, VIA NATURAL OR ARTIFICIAL OPENING ENDOSCOPIC, DIAGNOSTIC: ICD-10-PCS | Performed by: INTERNAL MEDICINE

## 2024-01-04 PROCEDURE — 0D758ZZ DILATION OF ESOPHAGUS, VIA NATURAL OR ARTIFICIAL OPENING ENDOSCOPIC: ICD-10-PCS | Performed by: INTERNAL MEDICINE

## 2024-01-04 PROCEDURE — 0DB38ZX EXCISION OF LOWER ESOPHAGUS, VIA NATURAL OR ARTIFICIAL OPENING ENDOSCOPIC, DIAGNOSTIC: ICD-10-PCS | Performed by: INTERNAL MEDICINE

## 2024-01-04 PROCEDURE — 0DB28ZX EXCISION OF MIDDLE ESOPHAGUS, VIA NATURAL OR ARTIFICIAL OPENING ENDOSCOPIC, DIAGNOSTIC: ICD-10-PCS | Performed by: INTERNAL MEDICINE

## 2024-01-04 PROCEDURE — 88305 TISSUE EXAM BY PATHOLOGIST: CPT | Performed by: INTERNAL MEDICINE

## 2024-01-04 RX ORDER — SODIUM CHLORIDE, SODIUM LACTATE, POTASSIUM CHLORIDE, CALCIUM CHLORIDE 600; 310; 30; 20 MG/100ML; MG/100ML; MG/100ML; MG/100ML
INJECTION, SOLUTION INTRAVENOUS CONTINUOUS
Status: DISCONTINUED | OUTPATIENT
Start: 2024-01-04 | End: 2024-01-04

## 2024-01-04 RX ORDER — LIDOCAINE HYDROCHLORIDE 10 MG/ML
INJECTION, SOLUTION EPIDURAL; INFILTRATION; INTRACAUDAL; PERINEURAL AS NEEDED
Status: DISCONTINUED | OUTPATIENT
Start: 2024-01-04 | End: 2024-01-04 | Stop reason: SURG

## 2024-01-04 RX ORDER — HYDROMORPHONE HYDROCHLORIDE 1 MG/ML
0.2 INJECTION, SOLUTION INTRAMUSCULAR; INTRAVENOUS; SUBCUTANEOUS EVERY 5 MIN PRN
Status: DISCONTINUED | OUTPATIENT
Start: 2024-01-04 | End: 2024-01-04

## 2024-01-04 RX ORDER — NALOXONE HYDROCHLORIDE 0.4 MG/ML
0.08 INJECTION, SOLUTION INTRAMUSCULAR; INTRAVENOUS; SUBCUTANEOUS AS NEEDED
Status: DISCONTINUED | OUTPATIENT
Start: 2024-01-04 | End: 2024-01-04

## 2024-01-04 RX ORDER — HYDROMORPHONE HYDROCHLORIDE 1 MG/ML
0.4 INJECTION, SOLUTION INTRAMUSCULAR; INTRAVENOUS; SUBCUTANEOUS EVERY 5 MIN PRN
Status: DISCONTINUED | OUTPATIENT
Start: 2024-01-04 | End: 2024-01-04

## 2024-01-04 RX ORDER — HYDROMORPHONE HYDROCHLORIDE 1 MG/ML
0.6 INJECTION, SOLUTION INTRAMUSCULAR; INTRAVENOUS; SUBCUTANEOUS EVERY 5 MIN PRN
Status: DISCONTINUED | OUTPATIENT
Start: 2024-01-04 | End: 2024-01-04

## 2024-01-04 RX ADMIN — SODIUM CHLORIDE, SODIUM LACTATE, POTASSIUM CHLORIDE, CALCIUM CHLORIDE: 600; 310; 30; 20 INJECTION, SOLUTION INTRAVENOUS at 08:30:00

## 2024-01-04 RX ADMIN — LIDOCAINE HYDROCHLORIDE 25 MG: 10 INJECTION, SOLUTION EPIDURAL; INFILTRATION; INTRACAUDAL; PERINEURAL at 07:50:00

## 2024-01-04 NOTE — DISCHARGE INSTRUCTIONS
Home Care Instructions for Colonoscopy and/or Gastroscopy with Sedation    Diet:  - Resume your regular diet as tolerated unless otherwise instructed.  - Start with light meals to minimize bloating.  - Do not drink alcohol today.    Medication:  - If you have questions about resuming your normal medications, please contact your Primary Care Physician.    Activities:  - Take it easy today. Do not return to work today.  - Do not drive today.  - Do not operate any machinery today (including kitchen equipment).    Colonoscopy:  - You may notice some rectal \"spotting\" (a little blood on the toilet tissue) for a day or two after the exam. This is normal.  - If you experience any rectal bleeding (not spotting), persistent tenderness or sharp severe abdominal pains, oral temperature over 100 degrees Fahrenheit, light-headedness or dizziness, or any other problems, contact your doctor.    Gastroscopy:  - You may have a sore throat for 2-3 days following the exam. This is normal. Gargling with warm salt water (1/2 tsp salt to 1 glass warm water) or using throat lozenges will help.  - If you experience any sharp pain in your neck, abdomen or chest, vomiting of blood, oral temperature over 100 degrees Fahrenheit, light-headedness or dizziness, or any other problems, contact your doctor.    **If unable to reach your doctor, please go to the [facility] Emergency Room**    - Your referring physician will receive a full report of your examination.  - If you do not hear from your doctor's office within two weeks of your biopsy, please call them for your results.    You may be able to see your laboratory results in [employer] between 4 and 7 business days.  In some cases, your physician may not have viewed the results before they are released to [employer].  If you have questions regarding your results contact the physician who ordered the test/exam by phone or via [employer] by choosing \"Ask a Medical Question.\"      Findings    1)  upper endoscopy was unremarkable.  Esophagus biopsies were taken    2) I performed a dilation (stretch) of the esophagus to see if that helps your trouble swallowing. There was no clear narrowing/stricture noted though    3) colon diverticulosis was noted (small pouches in the lining of the colon). I recommend a high fiber diet for this.    4) Colon polyps were noted and removed/biopsied during the procedure.  I will contact you (possible by mail, V I O message, or possible phone call) with the pathology results.  You may receive the results in [employer] before I have had a chance to review the results.        --no bleeding or large polyps noted to cause for the abnormal cologuard test   -- there is no anemia, and the polyps noted or the large hemorrhoids were noted and this could have contributed  --eliquis can be restarted tonight  --no clear cause of swallowing symptoms, some of the symptoms may be from food choices, swallowing habits etc but I ordered a barium test of the esophagus in the [employer] system to evaluate this          --The number to call to schedule the radiology testing is .  You can call this number to schedule the imaging test.       I will communicate results of the pathology with you, possibly via a letter, a phone call, or a \"V I O\" message.  You may receive the results in [employer] before I have had a chance to review the results.        Huan Contreras MD  [facility]

## 2024-01-04 NOTE — OPERATIVE REPORT
ENDOSCOPY OPERATIVE REPORT    Patient Name:  Dudley Connor  Medical Record #: [id number]  YOB: 1955  Date of Procedure: 1/4/2024    Preoperative Diagnosis:  gerd, dysphagia, abnormal cologuard    Postoperative Diagnosis:   1) unremarkable egd, esophagus empirically dilated to 20 mm.   2) nl ti, diverticulosis, 4 mm and 6 mm colon polyps    Procedure Performed: Esophagogastroduodenoscopy with biopsy and balloon dilation                Colonoscopy with snare     Anesthesia Given: MAC    Cecal withdrawal time: 17 minutes        Endoscopist:   Huan Contreras MD        Procedure:   After the patient was interviewed and the procedure again discussed and questions addressed, the patient was brought to the GI Lab and monitoring of the B/P, pulse, and pulse oximetry was performed. The patient was then placed in the left lateral decubitus position and sedated with divided doses of IV medication; continuous vital signs were monitored throughout the procedure.    A time-out procedure was performed, history and physical were reviewed, medical reconciliation was complete, informed consent was obtained.     The videogastroscope was intubated into the esophagus and advanced into the duodenum under directed vision without difficulty. Then withdrawn.    The patient was repositioned and prepared for the colonoscopy.  The scope was inserted through the rectum and advanced to the cecum as identified by the appendiceal orifice and ileocecal valve and into the ti and then withdrawn. The quality of the preparation was Aronchick grade 2 (good).  A thorough exam was performed throughout the procedures.    The patient tolerated the procedures well.       Findings:    The esophagus mucosa had an overall normal appearance , possible mild stricture at gej when viewed, see below. The Z-line  occurred  at the top of the gastric folds.    The gastric lumen was then entered and views of the gastric mucosa as well as retroverted  views of the fundus were unremarkable.     A normal pylorus was passed and the duodenal bulb entered, the duodenal bulb and post-bulbar duodenum were unremarkable.     The endoscope was then withdrawn into the gastric lumen and under direct endoscopic visualization a CRE Balloon was passed into the gastric lumen. The endoscope and balloon were then withdrawn and the balloon was straddled across the area of esophageal narrowing. The balloon was then inflated in a gradual/graded fashion from 18, 19, 20 mm, held in place and then deflated and withdrawn.  No significant dilated achieved and no clear stricture seen during dilation.    I then took biopsies from lower and mid/upper esophagus with cold forceps to evaluate for eosinophils           The terminal ileum was unremarkable    Endocuff used    In the proximal ascending colon, the scope was then retroflexed and withdrawn to the distal ascending colon while evaluating the mucosa. The scope was then straightened and then reinserted into the cecum and withdrawn in the forward viewing position.    A 4 mm and 6 mm polyp seen in the ascending and removed with the cold snare and recovered.    Diverticulosis from transverse through sigmoid noted    The overall visualized mucosal pattern of the colon was unremarkable.    At the anal verge the endoscope was retroverted, large internal hemorrhoids were noted.    No other abnormalities were identified.     The procedure was tolerated well and upon completion and throughtout the vital signs were stable.      Specimens:  See above      Condition on discharge from procedure:  good      PLAN:  Patient is to follow a high fiber, low fat diet and followup with primary physician for routine care.          --no bleeding or large polyps noted to cause for the abnormal cologuard test   -- there is no anemia, and the polyps noted or the large hemorrhoids were noted and this could have contributed  --eliquis can be restarted tonight  --no  clear cause of swallowing symptoms, some of the symptoms may be from food choices, swallowing habits etc but I ordered a barium test of the esophagus in the [facility] system to evaluate this      Patient stated it is okay to use mychart to communicate any pathology or follow up recommendations etc      Plan is to discharge home when Anesthesia post-surgical assessment determines patient is stable and has met discharge criteria.     The patient and  were informed of the endoscopic findings and was also given a copy of findings, postoperative instructions, and postoperative precautions.    Huan Contreras MD  [facility]

## 2024-01-04 NOTE — ANESTHESIA PREPROCEDURE EVALUATION
PRE-OP EVALUATION    Patient Name: Dudley Connor    Admit Diagnosis: POSITIVE COLORECTAL CANCER SCREENING USING COLOGUARD TEST; ESOPHAGEAL DYSPHAGIA; GASTROESOPHAGEAL REFLUX DISEASE, UNSPECIFED WHETHER ESOPHAGITIS PRESENT    Pre-op Diagnosis: POSITIVE COLORECTAL CANCER SCREENING USING COLOGUARD TEST; ESOPHAGEAL DYSPHAGIA; GASTROESOPHAGEAL REFLUX DISEASE, UNSPECIFED WHETHER ESOPHAGITIS PRESENT    ESOPHAGOGASTRODUODENOSCOPY POSSIBLE DILATION, COLONOSCOPY    Anesthesia Procedure: ESOPHAGOGASTRODUODENOSCOPY POSSIBLE DILATION, COLONOSCOPY  COLONOSCOPY    Surgeon(s) and Role:     * Huan Contreras MD - Primary    Pre-op vitals reviewed.  Temp: 97.8 °F (36.6 °C)  Pulse: 56  Resp: 20  BP: 132/79  SpO2: 96 %  Body mass index is 29.16 kg/m².    Current medications reviewed.  Hospital Medications:  • lactated ringers infusion   Intravenous Continuous       Outpatient Medications:     Medications Prior to Admission   Medication Sig Dispense Refill Last Dose   • carvedilol 6.25 MG Oral Tab Take 1 tablet (6.25 mg total) by mouth 2 (two) times daily with meals. 180 tablet 0 1/3/2024   • amLODIPine 10 MG Oral Tab Take 1 tablet (10 mg total) by mouth daily. 90 tablet 0 1/3/2024   • digoxin 0.25 MG Oral Tab Take 1 tablet (250 mcg total) by mouth daily. 90 tablet 0 1/3/2024   • rOPINIRole 1 MG Oral Tab Take 1 tablet (1 mg total) by mouth 3 (three) times daily. 90 tablet 5 1/3/2024   • tamsulosin (FLOMAX) cap Take 1 capsule (0.4 mg total) by mouth 2 (two) times a day. 180 capsule 0 1/3/2024   • Omeprazole 40 MG Oral Capsule Delayed Release Take 1 capsule (40 mg total) by mouth daily. 90 capsule 0 1/3/2024   • ALPRAZolam 0.5 MG Oral Tab Take 1 tablet (0.5 mg total) by mouth 2 (two) times daily as needed for Anxiety or Sleep. 60 tablet 1 1/3/2024   • buPROPion 100 MG Oral Tab Take 1 tablet (100 mg total) by mouth daily. MUST BE SEEN FOR ANY FURTHER REFILLS. 90 tablet 1 1/3/2024   • Meloxicam 15 MG Oral Tab Take 1 tablet (15 mg  total) by mouth daily. 30 tablet 5 Past Week   • sertraline 100 MG Oral Tab Take 2 tablets (200 mg total) by mouth daily. 180 tablet 1 1/3/2024   • Flecainide Acetate 150 MG Oral Tab Take 1 tablet (150 mg total) by mouth 2 (two) times daily. 180 tablet 1 1/3/2024   • gabapentin 300 MG Oral Cap Take 1 capsule (300 mg total) by mouth 3 (three) times daily. 270 capsule 0 1/3/2024   • apixaban (ELIQUIS) 5 MG Oral Tab Take 1 tablet (5 mg total) by mouth 2 (two) times daily. 180 tablet 1 12/29/2023   • losartan Potassium 100 MG Oral Tab Take 1 tablet (100 mg total) by mouth daily. 90 tablet 3 1/3/2024   • fluticasone-salmeterol 115-21 MCG/ACT Inhalation Aerosol Inhale 1 puff into the lungs 2 (two) times daily. 1 Inhaler 5 1/3/2024   • Albuterol Sulfate  (90 Base) MCG/ACT Inhalation Aero Soln Inhale 2 puffs into the lungs every 6 (six) hours as needed for Wheezing. 1 Inhaler 11 Unknown       Allergies: Chlorine and Penicillins      Anesthesia Evaluation    Patient summary reviewed.    Anesthetic Complications           GI/Hepatic/Renal      (+) GERD                           Cardiovascular                  (+) hypertension                                     Endo/Other                                  Pulmonary        (+) COPD       (+) shortness of breath            Neuro/Psych      (+) depression    (+) CVA   (+) TIA    (+) neuromuscular disease                   Past Surgical History:   Procedure Laterality Date   • ABLATION  2001    PVI   • ABLATION  2020    Dr.Shroff David. PVI   • ANGIOGRAM     • CARDIOVERSION     • OTHER SURGICAL HISTORY      back surgery   • OTHER SURGICAL HISTORY      hernia repair   • OTHER SURGICAL HISTORY Bilateral     knee replacement   • TOTAL KNEE REPLACEMENT Bilateral      Social History     Socioeconomic History   • Marital status:    Tobacco Use   • Smoking status: Former     Packs/day: 2.50     Years: 38.00     Additional pack years: 0.00     Total pack years: 95.00      Types: Cigarettes     Start date: 1955     Quit date: 2006     Years since quittin.0   • Smokeless tobacco: Never   Substance and Sexual Activity   • Alcohol use: Not Currently   • Drug use: No     History   Drug Use No     Available pre-op labs reviewed.               Airway      Mallampati: II  Mouth opening: 3 FB  TM distance: > 6 cm  Neck ROM: full Cardiovascular    Cardiovascular exam normal.  Rhythm: regular  Rate: normal     Dental             Pulmonary    Pulmonary exam normal.                 Other findings          ASA: 3   Plan: MAC  NPO status verified and patient meets guidelines.          Plan/risks discussed with: patient and significant other            Present on Admission:  **None**

## 2024-01-04 NOTE — ANESTHESIA POSTPROCEDURE EVALUATION
[facility]    Dudley Connor Patient Status:  Hospital Outpatient Surgery   Age/Gender 68 year old male MRN [id number]   Location [facility] ENDOSCOPY [facility] Attending Huan Contreras MD   Hosp Day # 0 PCP Morgan Rushing MD       Anesthesia Post-op Note    ESOPHAGOGASTRODUODENOSCOPY WITH DILATION TO 20MM AND BIOPSIES, COLONOSCOPY WITH COLD SNARE POLYPECTOMY    Procedure Summary       Date: 01/04/24 Room / Location:  ENDOSCOPY 03 / [facility] END[facility]    Anesthesia Start: 0746 Anesthesia Stop: 0830    Procedures:       ESOPHAGOGASTRODUODENOSCOPY WITH DILATION TO 20MM AND BIOPSIES, COLONOSCOPY WITH COLD SNARE POLYPECTOMY      COLONOSCOPY Diagnosis: (normal egd, diverticulosis, colon polyps)    Surgeons: Huan Contreras MD Anesthesiologist: Juan Carlos Chin MD    Anesthesia Type: MAC ASA Status: 3            Anesthesia Type: MAC    Vitals Value Taken Time   /70 01/04/24 0830   Temp 97 °F (36.1 °C) 01/04/24 0830   Pulse 65 01/04/24 0830   Resp 16 01/04/24 0830   SpO2 97 % 01/04/24 0830       Patient Location: Endoscopy    Anesthesia Type: MAC    Airway Patency: patent    Postop Pain Control: adequate    Mental Status: mildly sedated but able to meaningfully participate in the post-anesthesia evaluation    Nausea/Vomiting: none    Cardiopulmonary/Hydration status: stable euvolemic    Complications: no apparent anesthesia related complications    Postop vital signs: stable    Dental Exam: Unchanged from Preop

## 2024-01-04 NOTE — BRIEF OP NOTE
Pre-Operative Diagnosis: POSITIVE COLORECTAL CANCER SCREENING USING COLOGUARD TEST; ESOPHAGEAL DYSPHAGIA; GASTROESOPHAGEAL REFLUX DISEASE, UNSPECIFED WHETHER ESOPHAGITIS PRESENT     Post-Operative Diagnosis: normal egd, diverticulosis, colon polyps      Procedure Performed:   ESOPHAGOGASTRODUODENOSCOPY WITH DILATION TO 20MM AND BIOPSIES, COLONOSCOPY WITH COLD SNARE POLYPECTOMY    Surgeon(s) and Role:     * Huan Contreras MD - Primary    Assistant(s):        Surgical Findings: see op     Specimen: see op     Estimated Blood Loss: No data recorded    discharge instructions given to patient are including the following ...      Findings    1) upper endoscopy was unremarkable.  Esophagus biopsies were taken    2) I performed a dilation (stretch) of the esophagus to see if that helps your trouble swallowing. There was no clear narrowing/stricture noted though    3) colon diverticulosis was noted (small pouches in the lining of the colon). I recommend a high fiber diet for this.    4) Colon polyps were noted and removed/biopsied during the procedure.  I will contact you (possible by mail, Sportcut message, or possible phone call) with the pathology results.  You may receive the results in [facility] before I have had a chance to review the results.        --no bleeding or large polyps noted to cause for the abnormal cologuard test   -- there is no anemia, and the polyps noted or the large hemorrhoids were noted and this could have contributed  --eliquis can be restarted tonight  --no clear cause of swallowing symptoms, some of the symptoms may be from food choices, swallowing habits etc but I ordered a barium test of the esophagus in the [employer] system to evaluate this          --The number to call to schedule the radiology testing is .  You can call this number to schedule the imaging test.       I will communicate results of the pathology with you, possibly via a letter, a phone call, or a \"Sportcut\" message.   You may receive the results in MyChart before I have had a chance to review the results.        Huan Contreras MD  [facility]    Huan Contreras MD  1/4/2024  8:41 AM

## 2024-01-04 NOTE — H&P
Dudley NAVARRO Connor presents for endoscopy.      Denies new issues     States off eliquis over 48 hrs    Denies new/different dysphagia    States prep is clear    Denies any visible bleeding from anywhere    Patient stated it is okay to use mychart to communicate any pathology or follow up recommendations etc      Risk of egd and possible dilation colonoscopy including prep, sedation, bleeding, perforation, infection, miss rate or issues with accuracy, etc and possible morbidity/mortalty discussed.  We discussed risk, benefit, alternatives, limitations as well as not having the procedures.  All questions answered, pt demonstrated understanding.      Past Medical History:   Diagnosis Date    Anxiety state     Arrhythmia     a-fib    Back muscle spasm 01/15/2018    Chronic atrial fibrillation ([facility])     Chronic bilateral low back pain without sciatica 01/15/2018    Chronic obstructive pulmonary disease, unspecified COPD type ([facility]) 01/15/2018    COPD (chronic obstructive pulmonary disease) ([facility])     No O2    COVID-19 01/2021    no hospitallization; flu like symptoms; no lingering symptoms    Depression     Dyspnea     Esophageal reflux     Essential hypertension 01/15/2018    GI bleed     High blood pressure     Obesity (BMI 35.0-39.9 without comorbidity) 01/15/2018    Osteoarthritis     PVC's (premature ventricular contractions)     Scoliosis     Stroke ([facility]) 2019    TIA    TIA (transient ischemic attack)     Visual impairment     glasses       No current facility-administered medications on file prior to encounter.     Current Outpatient Medications on File Prior to Encounter   Medication Sig Dispense Refill    carvedilol 6.25 MG Oral Tab Take 1 tablet (6.25 mg total) by mouth 2 (two) times daily with meals. 180 tablet 0    amLODIPine 10 MG Oral Tab Take 1 tablet (10 mg total) by mouth daily. 90 tablet 0    digoxin 0.25 MG Oral Tab Take 1 tablet (250 mcg total) by mouth daily. 90 tablet 0    rOPINIRole 1 MG Oral Tab  Take 1 tablet (1 mg total) by mouth 3 (three) times daily. 90 tablet 5    tamsulosin (FLOMAX) cap Take 1 capsule (0.4 mg total) by mouth 2 (two) times a day. 180 capsule 0    Omeprazole 40 MG Oral Capsule Delayed Release Take 1 capsule (40 mg total) by mouth daily. 90 capsule 0    ALPRAZolam 0.5 MG Oral Tab Take 1 tablet (0.5 mg total) by mouth 2 (two) times daily as needed for Anxiety or Sleep. 60 tablet 1    buPROPion 100 MG Oral Tab Take 1 tablet (100 mg total) by mouth daily. MUST BE SEEN FOR ANY FURTHER REFILLS. 90 tablet 1    Meloxicam 15 MG Oral Tab Take 1 tablet (15 mg total) by mouth daily. 30 tablet 5    sertraline 100 MG Oral Tab Take 2 tablets (200 mg total) by mouth daily. 180 tablet 1    Flecainide Acetate 150 MG Oral Tab Take 1 tablet (150 mg total) by mouth 2 (two) times daily. 180 tablet 1    gabapentin 300 MG Oral Cap Take 1 capsule (300 mg total) by mouth 3 (three) times daily. 270 capsule 0    apixaban (ELIQUIS) 5 MG Oral Tab Take 1 tablet (5 mg total) by mouth 2 (two) times daily. 180 tablet 1    losartan Potassium 100 MG Oral Tab Take 1 tablet (100 mg total) by mouth daily. 90 tablet 3    fluticasone-salmeterol 115-21 MCG/ACT Inhalation Aerosol Inhale 1 puff into the lungs 2 (two) times daily. 1 Inhaler 5    Albuterol Sulfate  (90 Base) MCG/ACT Inhalation Aero Soln Inhale 2 puffs into the lungs every 6 (six) hours as needed for Wheezing. 1 Inhaler 11       Allergies   Allergen Reactions    Chlorine RASH and ITCHING    Penicillins RASH and ITCHING     Has taken ampicillin without issues        Past Surgical History:   Procedure Laterality Date    ABLATION  2001    PVI    ABLATION  2020    Dr.Shroff David. PVI    ANGIOGRAM      CARDIOVERSION      OTHER SURGICAL HISTORY      back surgery    OTHER SURGICAL HISTORY      hernia repair    OTHER SURGICAL HISTORY Bilateral     knee replacement    TOTAL KNEE REPLACEMENT Bilateral          ROS:  As above. Denies new ent issues, chest pain, sob,  cough, change in energy level or fatigue.  Mood is stable.  Denies new arthralgias/myalgias, rashes, or paresthesias/neurologic symptoms. Denies new bleeding or bruising except as stated. ROS negative except as stated    GEN: nad  HENT: eomi, mmm, normal cephalic  CVS: reg rate  LUNGS: cta b/l  ABD: soft, non tender, non distended  EXT: no edema      A/p  1 plan egd and possible dil and colonoscopy  today. Pt demonstrates understanding and is agreeable.    Huan Contreras MD  [facility]

## 2025-01-27 NOTE — PLAN OF CARE
Pain managed on scheduled medications. Right knee aquacel clean and dry - hemovac drain leaking, ok to discontinue drain per Dr Golden Day. IS encouraged. Plan to discharge home with Blossom Houser this afternoon once cleared by OT.  Plan of care discussed with patient w Patient calling with following questions:    Provider: Mai Christiansen MD    Patient having the following symptoms/issues: Patient called and stated that he feels his injection he had on 1/21 25 is not progressing. When he is at work it flairs up. Sleeping is hard at times also. Feels fine while sitting.     Please call patient at the following number:  Mobile [phone]     Patient states that it is okay to leave a detailed message.    Patient was informed that the patient would receive a phone call back within 2 business days, unless this request is STAT.    Preferred times to return call: before noon works nights.

## (undated) DEVICE — HERCULES 3 STAGE BALLOON ESOPHAGEAL: Brand: HERCULES

## (undated) DEVICE — SYRINGE,TOOMEY,IRRIGATION,70CC,STERILE: Brand: MEDLINE

## (undated) DEVICE — STERIS KITS

## (undated) DEVICE — Device

## (undated) DEVICE — 2T11 #2 PDO 36 X 36: Brand: 2T11 #2 PDO 36 X 36

## (undated) DEVICE — SUTURE VICRYL 2-0 CP-1

## (undated) DEVICE — BANDAGE ELASTIC ACE 6\" X-LONG

## (undated) DEVICE — 1200CC GUARDIAN II: Brand: GUARDIAN

## (undated) DEVICE — Device: Brand: STABLECUT®

## (undated) DEVICE — 3M™ TEGADERM™ +PAD FILM DRESSING WITH NON-ADHERENT PAD, 3587, 3-1/2 IN X 4-1/8 IN (9 CM X 10.5 CM), 25/CAR, 4 CAR/CS: Brand: 3M™ TEGADERM™

## (undated) DEVICE — 3M™ RED DOT™ MONITORING ELECTRODE WITH FOAM TAPE AND STICKY GEL, 50/BAG, 20/CASE, 72/PLT 2570: Brand: RED DOT™

## (undated) DEVICE — KIT DRN 1/8IN PVC 3 SPRG EVAC

## (undated) DEVICE — WRAP COOLING KNEE W/ICE PILLOW

## (undated) DEVICE — BIPOLAR SEALER 23-112-1 AQM 6.0: Brand: AQUAMANTYS™

## (undated) DEVICE — SYRINGE 30ML LL TIP

## (undated) DEVICE — BLADE OSTEOTOME 9986-21-22

## (undated) DEVICE — NEEDLE SPINAL 18X3-1/2 PINK.

## (undated) DEVICE — SOL  .9 1000ML BTL

## (undated) DEVICE — HOOD: Brand: FLYTE

## (undated) DEVICE — TRAP POLYP W/ 2 SPEC TY CLR MAGNIFYING WIND

## (undated) DEVICE — LIGHT HANDLE

## (undated) DEVICE — SUTURE ETHILON 3-0 PS-1

## (undated) DEVICE — ENDOCUFF ADULT GREEN

## (undated) DEVICE — LASSO POLYPECTOMY SNARE: Brand: LASSO

## (undated) DEVICE — KIT VLV 5 PC AIR H2O SUCT BX ENDOGATOR CONN

## (undated) DEVICE — 10FT COMBINED O2 DELIVERY/CO2 MONITORING. FILTER WITH MICROSTREAM TYPE LUER: Brand: DUAL ADULT NASAL CANNULA

## (undated) DEVICE — SUTURE VICRYL 0 CT-1

## (undated) DEVICE — DEVICE INFL 60ML 15ATM PRSS COOK SPHR

## (undated) DEVICE — DRAPE,U/SHT,SPLIT,FILM,60X84,STERILE: Brand: MEDLINE

## (undated) DEVICE — GAUZE SPONGES,12 PLY: Brand: CURITY

## (undated) DEVICE — Device: Brand: POWER-FLO®

## (undated) DEVICE — SCD SLEEVE KNEE HI BLEND

## (undated) DEVICE — DRAPE HALF 40X58 DYNJP2410

## (undated) DEVICE — SPECIMEN CONTAINER,POSITIVE SEAL INDICATOR, OR PACKAGED: Brand: PRECISION

## (undated) DEVICE — DILATION SYRINGE: Brand: COOK

## (undated) DEVICE — CONVERTORS STOCKINETTE: Brand: CONVERTORS

## (undated) DEVICE — SUTURE PDS II 1 CT-1

## (undated) DEVICE — STERILE POLYISOPRENE POWDER-FREE SURGICAL GLOVES: Brand: PROTEXIS

## (undated) DEVICE — SOL  .9 3000ML

## (undated) DEVICE — TOTAL KNEE CDS: Brand: MEDLINE INDUSTRIES, INC.

## (undated) DEVICE — BITEBLOCK ENDOSCP 60FR MAXI STRP

## (undated) DEVICE — UNIVERSAL STERIBUMP® STERILE (5/CASE): Brand: UNIVERSAL STERIBUMP®

## (undated) DEVICE — ALCOHOL 70% 4 OZ

## (undated) DEVICE — CHLORAPREP ORANGE TINT 10.5ML

## (undated) DEVICE — CHLORAPREP 26ML APPLICATOR

## (undated) DEVICE — HOOD, PEEL-AWAY: Brand: FLYTE

## (undated) DEVICE — STERILE SYNTHETIC POLYISOPRENE POWDER-FREE SURGICAL GLOVES WITH HYDROGEL COATING, SMOOTH FINISH, STRAIGHT FINGER: Brand: PROTEXIS

## (undated) NOTE — LETTER
OUTSIDE TESTING RESULT REQUEST     IMPORTANT: FOR YOUR IMMEDIATE ATTENTION  Please FAX all test results listed below to: [phone]         Patient Name: Alcario Bloch  Surgery Date: 10/28/2021  CSN: [id number]  Medical Record: [id number]   :

## (undated) NOTE — LETTER
OUTSIDE TESTING RESULT REQUEST     IMPORTANT: FOR YOUR IMMEDIATE ATTENTION  Please FAX all test results listed below to: [phone]           Patient Name: Kane Torres  Surgery Date: 2021  CSN: [id number]  Medical Record: [id number]   : /

## (undated) NOTE — LETTER
OUTSIDE TESTING RESULT REQUEST     IMPORTANT: FOR YOUR IMMEDIATE ATTENTION  Please FAX all test results listed below to: [phone]           Patient Name: Darvin Barrientos  Surgery Date: 2021  CSN: [id number]  Medical Record: [id number]   : /

## (undated) NOTE — IP AVS SNAPSHOT
Patient Demographics     Address  [id number] [place] [address] AT [facility] [address]  [place] [address] Phone  [phone] [facility])  [phone] (Work)  [phone] [facility]) E-mail Address  [email or web address]      Emergency Contact(s)     Name Relation Home Work Mobile    Cam after four to six weeks – check with surgeon at your office visit. Return to work  • Usually allowed after four to six weeks. Discuss specific work activities with your surgeon.       Restrictions  • For knee replacement surgery, follow instructions toothbrushes only. • Do not take aspirin while taking blood thinners unless ordered by your physician. • Review anticoagulant education information sheet provided. Discomfort  • Surgical discomfort is normal for one to two months.   • Have realistic go on narcotics, and laxatives such as Miralax or Milk of Magnesia if needed. • An enema or suppository may be needed if above measures do not work. Prevention of infection and promotion of healing  • Good hand washing is important.  Everyone should wash incision. • Increased or foul smelling drainage from incision  • Red streaks on skin near incision. • Temperature >101 F.  • Increased pain at incision not relieved by pain medication.             Signs of blood clot  • Pain, excessive tenderness, redness video at www.[facility].org/ortho-spine. Choose after surgery info.     Spanda- (compression sleeve) for KNEES    • All patients should wear the compression sleeves (SPANDA-) until first follow up visit to [address] office ( about 2-3 weeks) and then [address] [facility]                        Your medication list      TAKE these medications       Instructions Authorizing Provider Morning Afternoon Evening As Needed   acetaminophen 325 MG Tabs  Commonly known as: STEPHANE once a week. Flecainide Acetate 150 MG Tabs  Commonly known as: TAMBOCOR      Take 1 tablet (150 mg total) by mouth 2 (two) times daily.    Cintia Gunn MD         fluticasone-salmeterol 115-21 MCG/ACT Aero  Commonly known as: ADVAIR HFA      I Tromethamine (TORADOL) injection 15 mg 10/28/21 2143 Given      [id number] Ketorolac Tromethamine (TORADOL) injection 15 mg 10/29/21 0354 Given      [id number] Ketorolac Tromethamine (TORADOL) injection 15 mg 10/29/21 1031 Given      [id number] Sven Queen 10/29/21 1017 Given      [id number] tamsulosin (FLOMAX) cap 0.4 mg 10/29/21 1020 Given      [id number] tiZANidine HCl (ZANAFLEX) partial tablet 1 mg 10/28/21 1928 Given      [id number] traMADol (ULTRAM) tab 50 mg 10/28/21 2144 Given      [id number] traMADol (U ------                     FUNGUS CULTURE(P)[[id number]]                                In process                 Fungus UITSL[[id number]]                                     Final result                 Please view results for these tests on the individual Final result                 Please view results for these tests on the individual orders.     Fungus Stain [[id number]] Collected: 10/28/21 1517    Order Status: Completed Lab Status: Final result Updated: 10/29/21 1312    Specimen: Ti [id number]    Order Status: Completed Lab Status: Final result Updated: 10/29/21 1310    Specimen: Tissue from Knee, right      AFB Smear No Acid Fast Bacilli observed on direct smear    AFB Culture and Smear [[id number]] Collected: 10/28/21 9163    Order Status: [[id number]] Collected: 10/28/21 1506    Order Status: Completed Lab Status: Preliminary result Updated: 10/29/21 1012    Specimen: Tissue from Knee, right      Anaerobic Culture Pending    Anaerobic Culture [[id number]] Collected: 10/28/21 1508    Order Marcel Bailey CULTURE(P) In process    FUNGUS CULTURE(P) In process    FUNGUS CULTURE(P) In process    Specimen to Pathology Tissue In process    Anaerobic Culture Preliminary result    Anaerobic Culture Preliminary result    Anaerobic Culture Preliminary result    Tiss Osteoarthritis    • PVC's (premature ventricular contractions)    • Scoliosis    • TIA (transient ischemic attack)    • Visual impairment     glasses        Medications:  Scheduled:   • carvedilol  6.25 mg Oral BID with meals   • losartan  100 mg Oral Juan since quitting: 15.8      Smokeless tobacco: Never Used    Alcohol use: No       Family Hx:  Family History   Problem Relation Age of Onset   • Heart Attack Mother    • Heart Surgery Mother         9 stents   • Hypertension Mother    • Hypertension Father 10/29/2021  3:04 PM           D/C Summary    No notes of this type exist for this encounter.         Physical Therapy Notes (last 72 hours)      Physical Therapy Note signed by Glenny Rider PT at 10/29/2021 11:56 AM  Version 2 of 2    Author: WAQAS Miner 2001    PVI   • ABLATION  2020    Dr.Shroff David.  PVI   • ANGIOGRAM     • CARDIOVERSION     • OTHER SURGICAL HISTORY      back surgery   • OTHER SURGICAL HISTORY      hernia repair   • OTHER SURGICAL HISTORY Bilateral     knee replacement       HOME SI commode, etc.): A Little   -   Moving from lying on back to sitting on the side of the bed?: A Little   How much help from another person does the patient currently need. ..   -   Moving to and from a bed to a chair (including a wheelchair)?: A Little   - male admitted on 10/28/2021 for revision of R TKA. Pertinent comorbidities and personal factors impacting therapy include those listed above. Functional outcome measures completed include AMPAC with a score of 41.77% impairment.   Based on this evaluation problems.  *      Past Medical History  Past Medical History:   Diagnosis Date   • Anxiety state    • Back muscle spasm 1/15/2018   • Chronic atrial fibrillation (HCC)    • Chronic bilateral low back pain without sciatica 1/15/2018   • Chronic obstructive p RANGE OF MOTION AND STRENGTH ASSESSMENT  Upper extremity ROM and strength are within functional limits    Lower extremity ROM is within functional limits: except R knee s/p revision of TKA, approximately 5-57 degrees    Lower extremity strength is within session, RLE WBAT, no pillow under RLE, LE exercises (handout provided), safety, fall prevention, and discharge planning. Pt performed supine to sit transfer with [facility] flat and supervision.  Pt performed sit to stand transfers throughout session with [facility] Occupational Therapy Notes (last 72 hours)      Occupational Therapy Note signed by Suresh Casey at 10/29/2021  1:34 PM  Version 1 of 1    Author: Suresh Casey Service: Rehab Author Type: Occupational Therapist    Filed: 10/29/2021  1:34 PM Date of History  Past Surgical History:   Procedure Laterality Date   • ABLATION  2001    PVI   • ABLATION  2020    Dr.Shroff David.  PVI   • ANGIOGRAM     • CARDIOVERSION     • OTHER SURGICAL HISTORY      back surgery   • OTHER SURGICAL HISTORY      hernia repa bedpan or urinal? : A Little  -   Putting on and taking off regular upper body clothing?: A Little  -   Taking care of personal grooming such as brushing teeth?: None  -   Eating meals?: None    AM-PAC Score:  Score: 20  Approx Degree of Impairment: 38.32% performance deficits    Client Assessment/Performance Deficits  LOW - No comorbidities nor modifications of tasks    Clinical Decision Making  LOW - Analysis of occupational profile, problem-focused assessments, limited treatment options    Overall Complex Term Goal: To go home    Interventions:   - pain management  - therapy  - safety precautions  - See additional Care Plan goals for specific interventions